# Patient Record
Sex: MALE | Race: WHITE | NOT HISPANIC OR LATINO | URBAN - METROPOLITAN AREA
[De-identification: names, ages, dates, MRNs, and addresses within clinical notes are randomized per-mention and may not be internally consistent; named-entity substitution may affect disease eponyms.]

---

## 2023-02-01 ENCOUNTER — INPATIENT (INPATIENT)
Facility: HOSPITAL | Age: 73
LOS: 0 days | Discharge: HOME | End: 2023-02-02
Attending: HOSPITALIST | Admitting: HOSPITALIST
Payer: MEDICARE

## 2023-02-01 VITALS
TEMPERATURE: 101 F | DIASTOLIC BLOOD PRESSURE: 94 MMHG | SYSTOLIC BLOOD PRESSURE: 153 MMHG | HEART RATE: 121 BPM | RESPIRATION RATE: 18 BRPM

## 2023-02-01 DIAGNOSIS — M19.90 UNSPECIFIED OSTEOARTHRITIS, UNSPECIFIED SITE: ICD-10-CM

## 2023-02-01 DIAGNOSIS — J44.1 CHRONIC OBSTRUCTIVE PULMONARY DISEASE WITH (ACUTE) EXACERBATION: ICD-10-CM

## 2023-02-01 DIAGNOSIS — Z29.9 ENCOUNTER FOR PROPHYLACTIC MEASURES, UNSPECIFIED: ICD-10-CM

## 2023-02-01 DIAGNOSIS — I10 ESSENTIAL (PRIMARY) HYPERTENSION: ICD-10-CM

## 2023-02-01 DIAGNOSIS — A41.9 SEPSIS, UNSPECIFIED ORGANISM: ICD-10-CM

## 2023-02-01 DIAGNOSIS — I48.91 UNSPECIFIED ATRIAL FIBRILLATION: ICD-10-CM

## 2023-02-01 DIAGNOSIS — E78.5 HYPERLIPIDEMIA, UNSPECIFIED: ICD-10-CM

## 2023-02-01 DIAGNOSIS — E11.9 TYPE 2 DIABETES MELLITUS WITHOUT COMPLICATIONS: ICD-10-CM

## 2023-02-01 LAB
ALBUMIN SERPL ELPH-MCNC: 4.4 G/DL — SIGNIFICANT CHANGE UP (ref 3.3–5)
ALP SERPL-CCNC: 61 U/L — SIGNIFICANT CHANGE UP (ref 40–120)
ALT FLD-CCNC: 22 U/L — SIGNIFICANT CHANGE UP (ref 4–41)
ANION GAP SERPL CALC-SCNC: 13 MMOL/L — SIGNIFICANT CHANGE UP (ref 7–14)
APPEARANCE UR: CLEAR — SIGNIFICANT CHANGE UP
APTT BLD: 24.9 SEC — LOW (ref 27–36.3)
AST SERPL-CCNC: 22 U/L — SIGNIFICANT CHANGE UP (ref 4–40)
B PERT DNA SPEC QL NAA+PROBE: SIGNIFICANT CHANGE UP
B PERT+PARAPERT DNA PNL SPEC NAA+PROBE: SIGNIFICANT CHANGE UP
BASE EXCESS BLDV CALC-SCNC: 1 MMOL/L — SIGNIFICANT CHANGE UP (ref -2–3)
BASOPHILS # BLD AUTO: 0.05 K/UL — SIGNIFICANT CHANGE UP (ref 0–0.2)
BASOPHILS NFR BLD AUTO: 0.5 % — SIGNIFICANT CHANGE UP (ref 0–2)
BILIRUB SERPL-MCNC: 0.5 MG/DL — SIGNIFICANT CHANGE UP (ref 0.2–1.2)
BILIRUB UR-MCNC: NEGATIVE — SIGNIFICANT CHANGE UP
BLOOD GAS VENOUS COMPREHENSIVE RESULT: SIGNIFICANT CHANGE UP
BORDETELLA PARAPERTUSSIS (RAPRVP): SIGNIFICANT CHANGE UP
BUN SERPL-MCNC: 16 MG/DL — SIGNIFICANT CHANGE UP (ref 7–23)
C PNEUM DNA SPEC QL NAA+PROBE: SIGNIFICANT CHANGE UP
CALCIUM SERPL-MCNC: 9.1 MG/DL — SIGNIFICANT CHANGE UP (ref 8.4–10.5)
CHLORIDE BLDV-SCNC: 99 MMOL/L — SIGNIFICANT CHANGE UP (ref 96–108)
CHLORIDE SERPL-SCNC: 98 MMOL/L — SIGNIFICANT CHANGE UP (ref 98–107)
CO2 BLDV-SCNC: 29.1 MMOL/L — HIGH (ref 22–26)
CO2 SERPL-SCNC: 25 MMOL/L — SIGNIFICANT CHANGE UP (ref 22–31)
COLOR SPEC: SIGNIFICANT CHANGE UP
CREAT SERPL-MCNC: 1.05 MG/DL — SIGNIFICANT CHANGE UP (ref 0.5–1.3)
DIFF PNL FLD: NEGATIVE — SIGNIFICANT CHANGE UP
EGFR: 74 ML/MIN/1.73M2 — SIGNIFICANT CHANGE UP
EOSINOPHIL # BLD AUTO: 0.01 K/UL — SIGNIFICANT CHANGE UP (ref 0–0.5)
EOSINOPHIL NFR BLD AUTO: 0.1 % — SIGNIFICANT CHANGE UP (ref 0–6)
FLUAV SUBTYP SPEC NAA+PROBE: SIGNIFICANT CHANGE UP
FLUBV RNA SPEC QL NAA+PROBE: SIGNIFICANT CHANGE UP
GAS PNL BLDV: 132 MMOL/L — LOW (ref 136–145)
GLUCOSE BLDV-MCNC: 93 MG/DL — SIGNIFICANT CHANGE UP (ref 70–99)
GLUCOSE SERPL-MCNC: 97 MG/DL — SIGNIFICANT CHANGE UP (ref 70–99)
GLUCOSE UR QL: NEGATIVE — SIGNIFICANT CHANGE UP
HADV DNA SPEC QL NAA+PROBE: SIGNIFICANT CHANGE UP
HCO3 BLDV-SCNC: 28 MMOL/L — SIGNIFICANT CHANGE UP (ref 22–29)
HCOV 229E RNA SPEC QL NAA+PROBE: SIGNIFICANT CHANGE UP
HCOV HKU1 RNA SPEC QL NAA+PROBE: SIGNIFICANT CHANGE UP
HCOV NL63 RNA SPEC QL NAA+PROBE: SIGNIFICANT CHANGE UP
HCOV OC43 RNA SPEC QL NAA+PROBE: SIGNIFICANT CHANGE UP
HCT VFR BLD CALC: 48.9 % — SIGNIFICANT CHANGE UP (ref 39–50)
HCT VFR BLDA CALC: 47 % — SIGNIFICANT CHANGE UP (ref 39–51)
HGB BLD CALC-MCNC: 15.7 G/DL — SIGNIFICANT CHANGE UP (ref 13–17)
HGB BLD-MCNC: 15.3 G/DL — SIGNIFICANT CHANGE UP (ref 13–17)
HMPV RNA SPEC QL NAA+PROBE: DETECTED
HPIV1 RNA SPEC QL NAA+PROBE: SIGNIFICANT CHANGE UP
HPIV2 RNA SPEC QL NAA+PROBE: SIGNIFICANT CHANGE UP
HPIV3 RNA SPEC QL NAA+PROBE: SIGNIFICANT CHANGE UP
HPIV4 RNA SPEC QL NAA+PROBE: SIGNIFICANT CHANGE UP
IANC: 7.58 K/UL — HIGH (ref 1.8–7.4)
IMM GRANULOCYTES NFR BLD AUTO: 0.4 % — SIGNIFICANT CHANGE UP (ref 0–0.9)
INR BLD: 1.08 RATIO — SIGNIFICANT CHANGE UP (ref 0.88–1.16)
KETONES UR-MCNC: NEGATIVE — SIGNIFICANT CHANGE UP
LACTATE BLDV-MCNC: 2.9 MMOL/L — HIGH (ref 0.5–2)
LEUKOCYTE ESTERASE UR-ACNC: NEGATIVE — SIGNIFICANT CHANGE UP
LYMPHOCYTES # BLD AUTO: 0.92 K/UL — LOW (ref 1–3.3)
LYMPHOCYTES # BLD AUTO: 9.9 % — LOW (ref 13–44)
M PNEUMO DNA SPEC QL NAA+PROBE: SIGNIFICANT CHANGE UP
MCHC RBC-ENTMCNC: 27.1 PG — SIGNIFICANT CHANGE UP (ref 27–34)
MCHC RBC-ENTMCNC: 31.3 GM/DL — LOW (ref 32–36)
MCV RBC AUTO: 86.7 FL — SIGNIFICANT CHANGE UP (ref 80–100)
MONOCYTES # BLD AUTO: 0.7 K/UL — SIGNIFICANT CHANGE UP (ref 0–0.9)
MONOCYTES NFR BLD AUTO: 7.5 % — SIGNIFICANT CHANGE UP (ref 2–14)
NEUTROPHILS # BLD AUTO: 7.58 K/UL — HIGH (ref 1.8–7.4)
NEUTROPHILS NFR BLD AUTO: 81.6 % — HIGH (ref 43–77)
NITRITE UR-MCNC: NEGATIVE — SIGNIFICANT CHANGE UP
NRBC # BLD: 0 /100 WBCS — SIGNIFICANT CHANGE UP (ref 0–0)
NRBC # FLD: 0 K/UL — SIGNIFICANT CHANGE UP (ref 0–0)
PCO2 BLDV: 50 MMHG — SIGNIFICANT CHANGE UP (ref 42–55)
PH BLDV: 7.35 — SIGNIFICANT CHANGE UP (ref 7.32–7.43)
PH UR: 6 — SIGNIFICANT CHANGE UP (ref 5–8)
PLATELET # BLD AUTO: 176 K/UL — SIGNIFICANT CHANGE UP (ref 150–400)
PO2 BLDV: 28 MMHG — SIGNIFICANT CHANGE UP
POTASSIUM BLDV-SCNC: 3.9 MMOL/L — SIGNIFICANT CHANGE UP (ref 3.5–5.1)
POTASSIUM SERPL-MCNC: 4.1 MMOL/L — SIGNIFICANT CHANGE UP (ref 3.5–5.3)
POTASSIUM SERPL-SCNC: 4.1 MMOL/L — SIGNIFICANT CHANGE UP (ref 3.5–5.3)
PROT SERPL-MCNC: 7.2 G/DL — SIGNIFICANT CHANGE UP (ref 6–8.3)
PROT UR-MCNC: ABNORMAL
PROTHROM AB SERPL-ACNC: 12.5 SEC — SIGNIFICANT CHANGE UP (ref 10.5–13.4)
RAPID RVP RESULT: DETECTED
RBC # BLD: 5.64 M/UL — SIGNIFICANT CHANGE UP (ref 4.2–5.8)
RBC # FLD: 14.9 % — HIGH (ref 10.3–14.5)
RSV RNA SPEC QL NAA+PROBE: SIGNIFICANT CHANGE UP
RV+EV RNA SPEC QL NAA+PROBE: SIGNIFICANT CHANGE UP
SAO2 % BLDV: 45.6 % — SIGNIFICANT CHANGE UP
SARS-COV-2 RNA SPEC QL NAA+PROBE: SIGNIFICANT CHANGE UP
SODIUM SERPL-SCNC: 136 MMOL/L — SIGNIFICANT CHANGE UP (ref 135–145)
SP GR SPEC: 1.02 — SIGNIFICANT CHANGE UP (ref 1.01–1.05)
TROPONIN T, HIGH SENSITIVITY RESULT: 20 NG/L — SIGNIFICANT CHANGE UP
UROBILINOGEN FLD QL: SIGNIFICANT CHANGE UP
WBC # BLD: 9.3 K/UL — SIGNIFICANT CHANGE UP (ref 3.8–10.5)
WBC # FLD AUTO: 9.3 K/UL — SIGNIFICANT CHANGE UP (ref 3.8–10.5)

## 2023-02-01 PROCEDURE — 99223 1ST HOSP IP/OBS HIGH 75: CPT | Mod: GC

## 2023-02-01 PROCEDURE — 93306 TTE W/DOPPLER COMPLETE: CPT | Mod: 26

## 2023-02-01 PROCEDURE — 71045 X-RAY EXAM CHEST 1 VIEW: CPT | Mod: 26

## 2023-02-01 PROCEDURE — 99232 SBSQ HOSP IP/OBS MODERATE 35: CPT

## 2023-02-01 PROCEDURE — 99285 EMERGENCY DEPT VISIT HI MDM: CPT

## 2023-02-01 RX ORDER — APIXABAN 2.5 MG/1
5 TABLET, FILM COATED ORAL EVERY 12 HOURS
Refills: 0 | Status: DISCONTINUED | OUTPATIENT
Start: 2023-02-01 | End: 2023-02-02

## 2023-02-01 RX ORDER — ACETAMINOPHEN 500 MG
1000 TABLET ORAL ONCE
Refills: 0 | Status: COMPLETED | OUTPATIENT
Start: 2023-02-01 | End: 2023-02-01

## 2023-02-01 RX ORDER — IPRATROPIUM BROMIDE 0.2 MG/ML
500 SOLUTION, NON-ORAL INHALATION ONCE
Refills: 0 | Status: COMPLETED | OUTPATIENT
Start: 2023-02-01 | End: 2023-02-01

## 2023-02-01 RX ORDER — MONTELUKAST 4 MG/1
10 TABLET, CHEWABLE ORAL DAILY
Refills: 0 | Status: DISCONTINUED | OUTPATIENT
Start: 2023-02-01 | End: 2023-02-02

## 2023-02-01 RX ORDER — IPRATROPIUM/ALBUTEROL SULFATE 18-103MCG
3 AEROSOL WITH ADAPTER (GRAM) INHALATION ONCE
Refills: 0 | Status: COMPLETED | OUTPATIENT
Start: 2023-02-01 | End: 2023-02-01

## 2023-02-01 RX ORDER — PANTOPRAZOLE SODIUM 20 MG/1
40 TABLET, DELAYED RELEASE ORAL
Refills: 0 | Status: DISCONTINUED | OUTPATIENT
Start: 2023-02-01 | End: 2023-02-02

## 2023-02-01 RX ORDER — AZITHROMYCIN 500 MG/1
500 TABLET, FILM COATED ORAL EVERY 24 HOURS
Refills: 0 | Status: DISCONTINUED | OUTPATIENT
Start: 2023-02-02 | End: 2023-02-02

## 2023-02-01 RX ORDER — ACETAMINOPHEN 500 MG
650 TABLET ORAL ONCE
Refills: 0 | Status: DISCONTINUED | OUTPATIENT
Start: 2023-02-01 | End: 2023-02-01

## 2023-02-01 RX ORDER — ACETAMINOPHEN 500 MG
650 TABLET ORAL EVERY 6 HOURS
Refills: 0 | Status: DISCONTINUED | OUTPATIENT
Start: 2023-02-01 | End: 2023-02-02

## 2023-02-01 RX ORDER — SODIUM CHLORIDE 9 MG/ML
1000 INJECTION INTRAMUSCULAR; INTRAVENOUS; SUBCUTANEOUS ONCE
Refills: 0 | Status: COMPLETED | OUTPATIENT
Start: 2023-02-01 | End: 2023-02-01

## 2023-02-01 RX ORDER — KETOROLAC TROMETHAMINE 30 MG/ML
30 SYRINGE (ML) INJECTION ONCE
Refills: 0 | Status: DISCONTINUED | OUTPATIENT
Start: 2023-02-01 | End: 2023-02-01

## 2023-02-01 RX ORDER — AZITHROMYCIN 500 MG/1
500 TABLET, FILM COATED ORAL ONCE
Refills: 0 | Status: COMPLETED | OUTPATIENT
Start: 2023-02-01 | End: 2023-02-01

## 2023-02-01 RX ORDER — ONDANSETRON 8 MG/1
4 TABLET, FILM COATED ORAL ONCE
Refills: 0 | Status: COMPLETED | OUTPATIENT
Start: 2023-02-01 | End: 2023-02-01

## 2023-02-01 RX ORDER — METOPROLOL TARTRATE 50 MG
25 TABLET ORAL
Refills: 0 | Status: DISCONTINUED | OUTPATIENT
Start: 2023-02-01 | End: 2023-02-01

## 2023-02-01 RX ORDER — IPRATROPIUM BROMIDE 0.2 MG/ML
500 SOLUTION, NON-ORAL INHALATION EVERY 6 HOURS
Refills: 0 | Status: DISCONTINUED | OUTPATIENT
Start: 2023-02-01 | End: 2023-02-02

## 2023-02-01 RX ORDER — SIMVASTATIN 20 MG/1
20 TABLET, FILM COATED ORAL AT BEDTIME
Refills: 0 | Status: DISCONTINUED | OUTPATIENT
Start: 2023-02-01 | End: 2023-02-02

## 2023-02-01 RX ORDER — CEFTRIAXONE 500 MG/1
1000 INJECTION, POWDER, FOR SOLUTION INTRAMUSCULAR; INTRAVENOUS ONCE
Refills: 0 | Status: COMPLETED | OUTPATIENT
Start: 2023-02-01 | End: 2023-02-01

## 2023-02-01 RX ORDER — FOLIC ACID 0.8 MG
1 TABLET ORAL DAILY
Refills: 0 | Status: DISCONTINUED | OUTPATIENT
Start: 2023-02-01 | End: 2023-02-02

## 2023-02-01 RX ORDER — METOPROLOL TARTRATE 50 MG
25 TABLET ORAL
Refills: 0 | Status: DISCONTINUED | OUTPATIENT
Start: 2023-02-01 | End: 2023-02-02

## 2023-02-01 RX ADMIN — PANTOPRAZOLE SODIUM 40 MILLIGRAM(S): 20 TABLET, DELAYED RELEASE ORAL at 19:12

## 2023-02-01 RX ADMIN — APIXABAN 5 MILLIGRAM(S): 2.5 TABLET, FILM COATED ORAL at 19:11

## 2023-02-01 RX ADMIN — Medication 1 MILLIGRAM(S): at 19:12

## 2023-02-01 RX ADMIN — Medication 3 MILLILITER(S): at 10:23

## 2023-02-01 RX ADMIN — Medication 1000 MILLIGRAM(S): at 10:45

## 2023-02-01 RX ADMIN — Medication 25 MILLIGRAM(S): at 19:12

## 2023-02-01 RX ADMIN — AZITHROMYCIN 255 MILLIGRAM(S): 500 TABLET, FILM COATED ORAL at 10:57

## 2023-02-01 RX ADMIN — Medication 30 MILLIGRAM(S): at 11:53

## 2023-02-01 RX ADMIN — Medication 125 MILLIGRAM(S): at 10:15

## 2023-02-01 RX ADMIN — Medication 100 MILLIGRAM(S): at 19:03

## 2023-02-01 RX ADMIN — Medication 30 MILLIGRAM(S): at 11:23

## 2023-02-01 RX ADMIN — Medication 400 MILLIGRAM(S): at 10:14

## 2023-02-01 RX ADMIN — ONDANSETRON 4 MILLIGRAM(S): 8 TABLET, FILM COATED ORAL at 10:12

## 2023-02-01 RX ADMIN — MONTELUKAST 10 MILLIGRAM(S): 4 TABLET, CHEWABLE ORAL at 19:12

## 2023-02-01 RX ADMIN — SIMVASTATIN 20 MILLIGRAM(S): 20 TABLET, FILM COATED ORAL at 22:39

## 2023-02-01 RX ADMIN — Medication 500 MICROGRAM(S): at 18:33

## 2023-02-01 RX ADMIN — CEFTRIAXONE 100 MILLIGRAM(S): 500 INJECTION, POWDER, FOR SOLUTION INTRAMUSCULAR; INTRAVENOUS at 10:15

## 2023-02-01 RX ADMIN — Medication 100 MILLIGRAM(S): at 22:39

## 2023-02-01 RX ADMIN — SODIUM CHLORIDE 1000 MILLILITER(S): 9 INJECTION INTRAMUSCULAR; INTRAVENOUS; SUBCUTANEOUS at 10:11

## 2023-02-01 RX ADMIN — SODIUM CHLORIDE 1000 MILLILITER(S): 9 INJECTION INTRAMUSCULAR; INTRAVENOUS; SUBCUTANEOUS at 12:05

## 2023-02-01 NOTE — H&P ADULT - PROBLEM SELECTOR PLAN 7
DVT ppx: Eliquis  GI ppx: pantoprazole  Diet: DASH/CC  Code status: DNR/DNI molst in chart  Dispo: pending PT [facility] - c/w simvastatin

## 2023-02-01 NOTE — H&P ADULT - NSHPREVIEWOFSYSTEMS_GEN_ALL_CORE
REVIEW OF SYSTEMS    General: +fever, +fatigue. Denies weight loss, night sweats  Skin: Denies rashes, ulcers  Ophthalmologic: Denies vision changes  ENMT: Denies hearing changes  Respiratory: +dyspnea, +non-productive cough. Denies pleuritic chest pain  Cardiovascular: +orthopnea. Denies palpitations, LE edema, chest pain, syncope	  Gastrointestinal: +nausea/vomiting (briefly, after coming to ED this am). Denies diarrhea, blood in stool, abd pain  Genitourinary: Denies changes in urination  Musculoskeletal: Denies new joint pains  Neurological: Denies headache, weakness, numbness/tingling  Psychiatric: Denies depressed mood

## 2023-02-01 NOTE — H&P ADULT - PROBLEM SELECTOR PLAN 1
Febrile to 102.3, tachycardic, tachypneic on arrival. URI symptoms for several days & recent sick contacts. S/p CTX & azithromycin, 2L NS in ED.  - likely due to human metapneumovirus  - no consolidations on CXR  - f/u BCx  - monitor fever curve  - Tylenol for fever  - Tessalon perles, guaifenesin prn for cough Febrile to 102.3, tachycardic, tachypneic on arrival. URI symptoms for several days & recent sick contacts. S/p CTX & azithromycin, 2L NS in ED.  - likely due to human metapneumovirus  - no consolidations on CXR  Monitor off Abx for now  - f/u BCx  - monitor fever curve  - Tylenol for fever  - Tessalon perles, guaifenesin prn for cough

## 2023-02-01 NOTE — CHART NOTE - NSCHARTNOTEFT_GEN_A_CORE
Search Terms: Nic Borrero, 1950Search Date: 02/01/2023 21:19:08 PM  Searching on behalf of: [id number] - [facility]  The Drug Utilization Report below displays all of the controlled substance prescriptions, if any, that your patient has filled in the last twelve months. The information displayed on this report is compiled from pharmacy submissions to the Department, and accurately reflects the information as submitted by the pharmacies.    This report was requested by: Kaleb Padilla | Reference #: [id number] Search Terms: Nic Borrero, 1950Search Date: 02/01/2023 21:19:08 PM  Searching on behalf of: [id number] - [facility]  The Drug Utilization Report below displays all of the controlled substance prescriptions, if any, that your patient has filled in the last twelve months. The information displayed on this report is compiled from pharmacy submissions to the Department, and accurately reflects the information as submitted by the pharmacies.    This report was requested by: Kaleb Padilla | Reference #: [id number] Search Terms: Nic Borrero, 1950Search Date: 02/01/2023 21:19:08 PM  Searching on behalf of: [id number] - [facility]  The Drug Utilization Report below displays all of the controlled substance prescriptions, if any, that your patient has filled in the last twelve months. The information displayed on this report is compiled from pharmacy submissions to the Department, and accurately reflects the information as submitted by the pharmacies.    This report was requested by: Kaleb Padilla | Reference #: [id number]

## 2023-02-01 NOTE — H&P ADULT - NSHPLABSRESULTS_GEN_ALL_CORE
LABS:                          15.3   9.30  )-----------( 176      ( 2023 09:50 )             48.9         136  |  98  |  16  ----------------------------<  97  4.1   |  25  |  1.05    Ca    9.1      2023 09:50    TPro  7.2  /  Alb  4.4  /  TBili  0.5  /  DBili  x   /  AST  22  /  ALT  22  /  AlkPhos  61  02-    PT/INR - ( 2023 09:50 )   PT: 12.5 sec;   INR: 1.08 ratio      PTT - ( 2023 09:50 )  PTT:24.9 sec    Urinalysis Basic - ( 2023 13:00 )    Color: Light Yellow / Appearance: Clear / S.020 / pH: x  Gluc: x / Ketone: Negative  / Bili: Negative / Urobili: <2 mg/dL   Blood: x / Protein: Trace / Nitrite: Negative   Leuk Esterase: Negative / RBC: x / WBC x   Sq Epi: x / Non Sq Epi: x / Bacteria: x      RADIOLOGY:  Reviewed    < from: Xray Chest 1 View- PORTABLE-Urgent (23 @ 12:27) >    FINDINGS:  No focal opacity. No pleural effusion or pneumothorax.  The heart is normal in size. Cardiac loop recorder in place.  No acute osseous findings.    IMPRESSION:  No focal opacity.    < end of copied text > LABS:                          15.3   9.30  )-----------( 176      ( 2023 09:50 )             48.9         136  |  98  |  16  ----------------------------<  97  4.1   |  25  |  1.05    Ca    9.1      2023 09:50    TPro  7.2  /  Alb  4.4  /  TBili  0.5  /  DBili  x   /  AST  22  /  ALT  22  /  AlkPhos  61      PT/INR - ( 2023 09:50 )   PT: 12.5 sec;   INR: 1.08 ratio      PTT - ( 2023 09:50 )  PTT:24.9 sec    Urinalysis Basic - ( 2023 13:00 )    Color: Light Yellow / Appearance: Clear / S.020 / pH: x  Gluc: x / Ketone: Negative  / Bili: Negative / Urobili: <2 mg/dL   Blood: x / Protein: Trace / Nitrite: Negative   Leuk Esterase: Negative / RBC: x / WBC x   Sq Epi: x / Non Sq Epi: x / Bacteria: x      RADIOLOGY:  personally Reviewed CXR: no consolidations  EKG personally reviewed: Afib with RVR 140s    < from: Xray Chest 1 View- PORTABLE-Urgent (23 @ 12:27) >    FINDINGS:  No focal opacity. No pleural effusion or pneumothorax.  The heart is normal in size. Cardiac loop recorder in place.  No acute osseous findings.    IMPRESSION:  No focal opacity.    < end of copied text >

## 2023-02-01 NOTE — CONSULT NOTE ADULT - ASSESSMENT
72 yr old male, PMH of HTN, HLD, COPD, presents to the Emergency Room with shortness of breath and dry cough. Pt states that he has a loop recorder ([employer]) that was placed over a year ago by his cardiologist in [place] concerned for "low heart rate", but denies any symptoms. Pt was a previous smoker w/ 35 yrs of 6ppd (stopped at 48 yrs old).    In the ED: pt was in afibb w/RVR up to 130's. Upon assessment, pt was rate control between 100-110 HR in Afibb. Pt denied any chest pain, palpitation, fever, headache, N/V, or dizziness. Endorses SOB, but is on room air saturating between 93-96% SpO2.     Plan:     -Currently rate control between 100 - 110 HR in Afibb  -Continue on telemetry monitoring   -Start patient on metoprolol 25mg BID  -Recommend anticoagulation w/ Eliquis 5mg BID  -[id number] score: 2 (age and hypertension)  -Maintain K >4.0 and Mg >2 (replenish as needed)  -Obtain ECHO  -Continue with care with primary team      72 yr old male, PMH of HTN, HLD, COPD, presents to the Emergency Room with shortness of breath and dry cough. Pt states that he has a loop recorder ([employer]) that was placed over a year ago by his cardiologist in [place] concerned for "low heart rate", but denies any symptoms. Pt was a previous smoker w/ 35 yrs of 6ppd (stopped at 48 yrs old).    In the ED: pt was in afibb w/RVR up to 130's. Upon assessment, pt was rate control between 100-110 HR in Afibb. Pt denied any chest pain, palpitation, fever, headache, N/V, or dizziness. Endorses SOB, but is on room air saturating between 93-96% SpO2.     Plan:     -Currently rate control between 100 - 110 HR in Afibb  -Continue on telemetry monitoring   -Start patient on metoprolol 25mg BID  -Recommend anticoagulation w/ Eliquis 5mg BID  -[id number] score: 2 (age and hypertension)  -Maintain K >4.0 and Mg >2 (replenish as needed)  -Obtain ECHO  -Continue with care with primary team      72 yr old male, PMH of HTN, HLD, COPD, presents to the Emergency Room with shortness of breath and dry cough. Pt states that he has a loop recorder ([employer]) that was placed over a year ago by his cardiologist in [place] concerned for "low heart rate", but denies any symptoms. Pt was a previous smoker w/ 35 yrs of 6ppd (stopped at 48 yrs old).    In the ED: pt was in afibb w/RVR up to 130's. Upon assessment, pt was rate control between 100-110 HR in Afibb. Pt denied any chest pain, palpitation, fever, headache, N/V, or dizziness. Endorses SOB, but is on room air saturating between 93-96% SpO2.     Plan:     -Currently rate control between 100 - 110 HR in Afibb  -Continue on telemetry monitoring   -Start patient on metoprolol 25mg BID  -Recommend anticoagulation w/ Eliquis 5mg BID  -[id number] score: 2 (age and hypertension)  -Maintain K >4.0 and Mg >2 (replenish as needed)  -Obtain ECHO  -Continue with care with primary team      72 yr old male, PMH of HTN, HLD, COPD, presents to the Emergency Room with shortness of breath and dry cough. Pt states that he has a loop recorder ([employer]) that was placed over a year ago by his cardiologist in [place] concerned for "low heart rate", but denies any symptoms. Pt was a previous smoker w/ 35 yrs of 6ppd (stopped at 48 yrs old).    In the ED: pt was in afib w/RVR up to 130's. Upon assessment, pt was rate control between 100-110 HR in Afibb. Pt denied any chest pain, palpitation, fever, headache, N/V, or dizziness. Endorses SOB, but is on room air saturating between 93-96% SpO2.     Plan:     -Currently rate control between 100 - 110 HR in Afibb  -Continue on telemetry monitoring   -Start patient on metoprolol 25mg BID  -Recommend anticoagulation w/ Eliquis 5mg BID  -[id number] score: 2 (age and hypertension)  -Maintain K >4.0 and Mg >2 (replenish as needed)  -Obtain ECHO  -Obtain TFTs  -Continue with care with primary team      72 yr old male, PMH of HTN, HLD, COPD, presents to the Emergency Room with shortness of breath and dry cough. Pt states that he has a loop recorder ([employer]) that was placed over a year ago by his cardiologist in [place] concerned for "low heart rate", but denies any symptoms. Pt was a previous smoker w/ 35 yrs of 6ppd (stopped at 48 yrs old).    In the ED: pt was in afib w/RVR up to 130's. Upon assessment, pt was rate control between 100-110 HR in Afibb. Pt denied any chest pain, palpitation, fever, headache, N/V, or dizziness. Endorses SOB, but is on room air saturating between 93-96% SpO2.     Plan:     -Currently rate control between 100 - 110 HR in Afibb  -Continue on telemetry monitoring   -Start patient on metoprolol 25mg BID  -Recommend anticoagulation w/ Eliquis 5mg BID  -[id number] score: 2 (age and hypertension)  -Maintain K >4.0 and Mg >2 (replenish as needed)  -Obtain ECHO  -Obtain TFTs  -Continue with care with primary team      72 yr old male, PMH of HTN, HLD, COPD, presents to the Emergency Room with shortness of breath and dry cough. Pt states that he has a loop recorder ([employer]) that was placed over a year ago by his cardiologist in [place] concerned for "low heart rate", but denies any symptoms. Pt was a previous smoker w/ 35 yrs of 6ppd (stopped at 48 yrs old).    In the ED: pt was in afib w/RVR up to 130's. Upon assessment, pt was rate control between 100-110 HR in Afibb. Pt denied any chest pain, palpitation, fever, headache, N/V, or dizziness. Endorses SOB, but is on room air saturating between 93-96% SpO2.     Plan:     -Currently rate control between 100 - 110 HR in Afibb  -Continue on telemetry monitoring   -Start patient on metoprolol 25mg BID  -Recommend anticoagulation w/ Eliquis 5mg BID  -[id number] score: 2 (age and hypertension)  -Maintain K >4.0 and Mg >2 (replenish as needed)  -Obtain ECHO  -Obtain TFTs  -Continue with care with primary team

## 2023-02-01 NOTE — ED ADULT NURSE NOTE - OBJECTIVE STATEMENT
pt received in rm 22 AAO x 3. pt with hx of copd, DM2. pt c/o shortness of breath since last night and cough. pt denies chest pain, v/d. pt febrile. Respirations 28 at this time. no audible wheezing noted. 20g iv placed to left antecubital. pt placed on cardiac monitor. will continue to monitor.

## 2023-02-01 NOTE — H&P ADULT - NSHPPHYSICALEXAM_GEN_ALL_CORE
T(C): 37 (02-01-23 @ 15:15), Max: 39.1 (02-01-23 @ 10:00)  HR: 106 (02-01-23 @ 15:15) (106 - 149)  BP: 111/68 (02-01-23 @ 15:15) (111/68 - 153/94)  RR: 20 (02-01-23 @ 15:15) (18 - 30)  SpO2: 97% (02-01-23 @ 15:15) (97% - 100%)    ***  CONSTITUTIONAL: Well groomed, no apparent distress  EYES: PERRLA and symmetric, EOMI, No conjunctival or scleral injection, non-icteric  ENMT: Oral mucosa with moist membranes. Normal dentition; no pharyngeal injection or exudates             NECK: Supple, symmetric and without tracheal deviation   RESP: No respiratory distress, no use of accessory muscles; CTA b/l, no WRR  CV: RRR, +S1S2, no MRG; no JVD; no peripheral edema  GI: Soft, NT, ND, no rebound, no guarding; no palpable masses; no hepatosplenomegaly; no hernia palpated  LYMPH: No cervical LAD or tenderness; no axillary LAD or tenderness; no inguinal LAD or tenderness  MSK: Normal gait; No digital clubbing or cyanosis; examination of the (head/neck/spine/ribs/pelvis, RUE, LUE, RLE, LLE) without misalignment,            Normal ROM without pain, no spinal tenderness, normal muscle strength/tone  SKIN: No rashes or ulcers noted; no subcutaneous nodules or induration palpable  NEURO: CN II-XII intact; normal reflexes in upper and lower extremities, sensation intact in upper and lower extremities b/l to light touch   PSYCH: Appropriate insight/judgment; A+O x 3, mood and affect appropriate, recent/remote memory intact T(C): 37 (02-01-23 @ 15:15), Max: 39.1 (02-01-23 @ 10:00)  HR: 106 (02-01-23 @ 15:15) (106 - 149)  BP: 111/68 (02-01-23 @ 15:15) (111/68 - 153/94)  RR: 20 (02-01-23 @ 15:15) (18 - 30)  SpO2: 97% (02-01-23 @ 15:15) (97% - 100%)    CONSTITUTIONAL: No apparent distress  EYES: Sclera clear  ENMT: Oral mucosa with moist membranes  NECK: Supple, symmetric  RESP: Non-labored on NC. Diminished breath sounds in bases, otherwise clear to auscultation, no wheeze or crackles  CV: Irregular rhythm, tachycardic, no murmurs. Trace LE pitting edema  GI: Soft, non-tender, mildly distended, bowel sounds normal  MSK: Normal ROM without pain. No joint effusions noted  SKIN: No rashes or ulcers noted  NEURO: No focal deficits, strength symmetric & intact  PSYCH: A&Ox3 T(C): 37 (02-01-23 @ 15:15), Max: 39.1 (02-01-23 @ 10:00)  HR: 106 (02-01-23 @ 15:15) (106 - 149)  BP: 111/68 (02-01-23 @ 15:15) (111/68 - 153/94)  RR: 20 (02-01-23 @ 15:15) (18 - 30)  SpO2: 97% (02-01-23 @ 15:15) (97% - 100%)    CONSTITUTIONAL: No apparent distress, on NC but turned off  EYES: Sclera clear  ENMT: Oral mucosa with moist membranes  NECK: Supple, symmetric  RESP: Non-labored on NC. Diminished breath sounds in bases, otherwise clear to auscultation, no wheeze or crackles  CV: Irregular rhythm, tachycardic, no murmurs. Trace LE pitting edema  GI: Soft, non-tender, mildly distended, bowel sounds normal  MSK: Normal ROM without pain. No joint effusions noted  SKIN: No rashes or ulcers noted  NEURO: No focal deficits, strength symmetric & intact  PSYCH: A&Ox3

## 2023-02-01 NOTE — H&P ADULT - ASSESSMENT
Mr. Nic Borrero is a 73 yo M with pmh of COPD on daily oral steroids, HTN, HLD, presenting with URI symptoms, SOB x1 day, concern for COPD exacerbation in setting of positive hMPV. Tachycardic on admission & found to have new AFib.    Mr. Nic Borrero is a 71 yo M with pmh of COPD on daily oral steroids, HTN, HLD, presenting with URI symptoms, SOB x1 day, concern for COPD exacerbation in setting of positive hMPV. Tachycardic on admission & found to have new AFib.    Mr. Nic Borrero is a 73 yo M with pmh of COPD on daily oral steroids, HTN, HLD, presenting with URI symptoms, SOB x1 day. Concern for sepsis 2/2 hMPV, tachycardic on admission & found to have new AFib.    Mr. Nic Borrero is a 71 yo M with pmh of COPD on daily oral steroids, HTN, HLD, presenting with URI symptoms, SOB x1 day. Concern for sepsis 2/2 hMPV, tachycardic on admission & found to have new AFib.    Mr. Nic Borrero is a 73 yo M with pmh of COPD on daily oral steroids, HTN, HLD, RA, presenting with URI symptoms, SOB x1 day. Concern for sepsis 2/2 hMPV, tachycardic on admission & found to have new AFib.    Mr. Nic Borrero is a 71 yo M with pmh of COPD on daily oral steroids, HTN, HLD, RA, presenting with URI symptoms, SOB x1 day. Concern for sepsis 2/2 hMPV, tachycardic on admission & found to have new AFib.    MrJosefina Borrero is a 73 yo M visiting from [place], PMH COPD on daily oral steroids, HTN, HLD, RA, presenting with URI symptoms, SOB x1 day. Found to have sepsis 2/2 hMPV c/b new AFib with RVR.   MrJosefina Borrero is a 71 yo M visiting from [place], PMH COPD on daily oral steroids, HTN, HLD, RA, presenting with URI symptoms, SOB x1 day. Found to have sepsis 2/2 hMPV c/b new AFib with RVR.   MrJosefina Borrero is a 73 yo M visiting from [place], PMH COPD on daily oral steroids, HTN, HLD, RA, presenting with URI symptoms, SOB x1 day. Found to have sepsis 2/2 hMPV c/b new AFib with RVR.

## 2023-02-01 NOTE — ED ADULT NURSE NOTE - CHIEF COMPLAINT QUOTE
pATIENT BROUGHT TO er BY ems FOR C/O SHORTNESS OF BREATH FOR THE PAST COUPLE OF DAYS. PT HAS A COUGH. Type 2 DM: FS: 100

## 2023-02-01 NOTE — H&P ADULT - PROBLEM SELECTOR PLAN 2
Longstanding COPD on home prednisone. Former smoker (quit >20 yrs ago). S/p IV Solumedrol 125 in ED  - resume oral prednisone 20 mg   - c/w home montelukast 10 mg  - ipratropium q6h prn - will avoid beta agonists in setting of tachycardia  - c/w azithromycin x3 days for inflammation  - incentive spirometry  - supplemental O2 prn

## 2023-02-01 NOTE — ED PROVIDER NOTE - ATTENDING APP SHARED VISIT CONTRIBUTION OF CARE
Reji Soni DO:  patient seen and evaluated with the PA.  I was present for key portions of the History & Physical, and I agree with the Impression & Plan. 73 yo m pmh htn, hld, asthma, copd, former smoker pw sob.  PW wife bedside, provides collateral.  States that she had a URI 1 week ago, patient then caught it.  Patient has been unwell for the last few days.  Has had malaise, cough, dry, SOB.  Has had mild relief with home medications including inhalers.  Today developed acute worsening shortness of breath prompting evaluation.  Patient also reports F/C.  Also endorses nausea and vomiting.,  Nonbloody nonbilious.  Denies leg swelling and chest pain.  Patient arrives tachycardic, tachypneic, fatigued appearing.  Patient with clear lungs, reduced excursion.  Bilaterally.  No lower extremity swelling.  No history of CHF.  Suspect respiratory infection and albuterol use leading to consequence of A-fib.  We will check blood work, imaging, treat symptoms, reassess.  Will treat for CAP. Reji Soni DO:  patient seen and evaluated with the PA.  I was present for key portions of the History & Physical, and I agree with the Impression & Plan. 71 yo m pmh htn, hld, asthma, copd, former smoker pw sob.  PW wife bedside, provides collateral.  States that she had a URI 1 week ago, patient then caught it.  Patient has been unwell for the last few days.  Has had malaise, cough, dry, SOB.  Has had mild relief with home medications including inhalers.  Today developed acute worsening shortness of breath prompting evaluation.  Patient also reports F/C.  Also endorses nausea and vomiting.,  Nonbloody nonbilious.  Denies leg swelling and chest pain.  Patient arrives tachycardic, tachypneic, fatigued appearing.  Patient with clear lungs, reduced excursion.  Bilaterally.  No lower extremity swelling.  No history of CHF.  Suspect respiratory infection and albuterol use leading to consequence of A-fib.  We will check blood work, imaging, treat symptoms, reassess.  Will treat for CAP. Reji Soni DO:  patient seen and evaluated with the PA.  I was present for key portions of the History & Physical, and I agree with the Impression & Plan. 73 yo m pmh htn, hld, asthma, copd, former smoker pw sob.  PW wife bedside, provides collateral.  States that she had a URI 1 week ago, patient then caught it.  Patient has been unwell for the last few days.  Has had malaise, cough, dry, SOB.  Has had mild relief with home medications including inhalers.  Today developed acute worsening shortness of breath prompting evaluation.  Patient also reports F/C.  Also endorses nausea and vomiting.,  Nonbloody nonbilious.  Denies leg swelling and chest pain.  Patient arrives tachycardic, tachypneic, fatigued appearing.  Patient with clear lungs, reduced excursion.  Bilaterally.  No lower extremity swelling.  No history of CHF.  Suspect respiratory infection and albuterol use leading to consequence of A-fib.  We will check blood work, imaging, treat symptoms, reassess.  Will treat for CAP.. Reji Soni DO:  patient seen and evaluated with the PA.  I was present for key portions of the History & Physical, and I agree with the Impression & Plan. 71 yo m pmh htn, hld, asthma, copd, former smoker pw sob.  PW wife bedside, provides collateral.  States that she had a URI 1 week ago, patient then caught it.  Patient has been unwell for the last few days.  Has had malaise, cough, dry, SOB.  Has had mild relief with home medications including inhalers.  Today developed acute worsening shortness of breath prompting evaluation.  Patient also reports F/C.  Also endorses nausea and vomiting.,  Nonbloody nonbilious.  Denies leg swelling and chest pain.  Patient arrives tachycardic, tachypneic, fatigued appearing.  Patient with clear lungs, reduced excursion.  Bilaterally.  No lower extremity swelling.  No history of CHF.  Suspect respiratory infection and albuterol use leading to consequence of A-fib.  We will check blood work, imaging, treat symptoms, reassess.  Will treat for CAP..

## 2023-02-01 NOTE — H&P ADULT - ATTENDING COMMENTS
72 yr old man visiting from [place] PMH COPD on daily oral steroids, HTN, HLD, RA p/w malaise, worsening cough and SOB x1 day. Found to have sepsis 2/2 hMPV c/b Afib with RVR.     Lungs with decreased BS at bases, no crackles wheezing, normal respiratory effort    Supportive care for hMPV: Start ipratropium Nebs q6, tessalon perles, guaifenesin, incentive spirometry  F/u Bcx, monitor off ABx for now  Afib rate better controlled ranging up to 110s, c/w tele monitoring, start Eliquis 5mg BID for CHADSVASC of 2, metoprolol 25mg BID per EP recs  Check TTE, TSH, FT4  PT eval 72 yr old man visiting from [place] PMH COPD on daily oral steroids, HTN, HLD, RA p/w malaise, worsening cough and SOB x1 day. Found to have sepsis 2/2 hMPV c/b Afib with RVR.     Lungs with decreased BS at bases, no crackles wheezing, normal respiratory effort    Supportive care for hMPV: Start ipratropium Nebs q6, tessalon perles, guaifenesin, incentive spirometry  C/w home prednisone 20mg qd  F/u Bcx, monitor off ABx for now  Afib rate better controlled ranging up to 110s, c/w tele monitoring, start Eliquis 5mg BID for CHADSVASC of 2, metoprolol 25mg BID per EP recs  Check TTE, TSH, FT4  PT eval

## 2023-02-01 NOTE — H&P ADULT - PROBLEM SELECTOR PLAN 4
Pt has hx of rheumatoid arthitis on weekly MTX.   - Tylenol prn for pain  - avoid MTX inpt during acute infection

## 2023-02-01 NOTE — H&P ADULT - PROBLEM SELECTOR PLAN 3
New onset AF RVR on admission, tachy to 140s. Improved rate control following fluid resuscitation.   - pt has implanted loop recorder (~1.5 yrs prior) for "low heart rate," pt believes HR was in 40s-50s. No hx syncope  - admit to tele  - f/u TTE  - f/u TSH/T4  - metoprolol tartrate 25 mg bid  - Eliquis 5 bid  - EP recs appreciated

## 2023-02-01 NOTE — H&P ADULT - PROBLEM SELECTOR PLAN 8
DVT ppx: Eliquis  GI ppx: pantoprazole  Diet: DASH/CC  Code status: DNR/DNI molst in chart  Dispo: pending PT [place]

## 2023-02-01 NOTE — ED ADULT NURSE NOTE - INTERVENTIONS DEFINITIONS
[place] to call system/Call bell, personal items and telephone within reach/Instruct patient to call for assistance/Non-slip footwear when patient is off stretcher/Physically safe environment: no spills, clutter or unnecessary equipment/Stretcher in lowest position, wheels locked, appropriate side rails in place/Monitor gait and stability/Reinforce activity limits and safety measures with patient and family [place] to call system/Call bell, personal items and telephone within reach/Instruct patient to call for assistance/Non-slip footwear when patient is off stretcher/Physically safe environment: no spills, clutter or unnecessary equipment/Stretcher in lowest position, wheels locked, appropriate side rails in place/Monitor gait and stability/Reinforce activity limits and safety measures with patient and family [place] to call system/Call bell, personal items and telephone within reach/Instruct patient to call for assistance/Non-slip footwear when patient is off stretcher/Physically safe environment: no spills, clutter or unnecessary equipment/Stretcher in lowest position, wheels locked, appropriate side rails in place/Monitor gait and stability/Reinforce activity limits and safety measures with patient and family

## 2023-02-01 NOTE — ED PROVIDER NOTE - CARE PLAN
1 Principal Discharge DX:	COPD exacerbation  Secondary Diagnosis:	Respiratory distress, acute  Secondary Diagnosis:	Bronchitis due to human metapneumovirus (hMPV)

## 2023-02-01 NOTE — ED PROVIDER NOTE - PROGRESS NOTE DETAILS
Reji Soni, DO: pt reassessed. being resuscitated. hr 120-130s. suspect secondary to sepsis/resp infection. will monitor and provide sx control prior to rate control. JE Jerez: Pt reassessed, tachypnea and retractions have resolved, HR now in the 110s from 140s w/ tylenol/toradol and fluids. +Human meta-pneumovirus. Spoke w/ hospitalist regarding afib and anticoagulation use, rec we call EP for eval. SPoke w/ EP, will come to see pt.

## 2023-02-01 NOTE — H&P ADULT - HISTORY OF PRESENT ILLNESS
Mr. Nic Borrero is a 73 yo M with pmh of COPD on daily oral steroids, HTN, HLD, presenting with worsening malaise, cough, decreased appetite for a few days, and worsening shortness of breath x1 day.     Mr. Nic Borrero is a 71 yo M with pmh of COPD on daily oral steroids, HTN, HLD, presenting with worsening malaise, cough, decreased appetite for a few days, and worsening shortness of breath x1 day.    Pt reports recent URI symptoms for the past few days. His wife & grandson were sick with similar symptoms. Endorses non-productive cough, fatigue, decreased PO intake. Last night he became increasingly dyspneic at rest. Denies chest pain, palpitations, dizziness, abd pain, blood in stool/urine, or pain/swelling in LEs. Briefly unsteady on his feet this morning when he stood up, but did not fall. Of note, pt had loop recorder implanted by his cardiologist ~1.5 yrs ago for low HR, pt says his HR was 40s-50s. No hx syncope.    Pt lives in [place] and came to [place] on 1/21 to visit family. He has longstanding COPD on prednisone 20 mg daily, not on home O2, and is a former smoker who quit >20 years ago.    On arrival to ED, pt febrile to 102.3, tachycardic in Afib to 140s, tachypneic to RR 30. Non-hypoxic on RA. S/p azithromycin & CTX, Solumedrol. S/p 2L NS. Pt's HR did improve to the 100s-110s following fluid resuscitation.     Mr. Nic Borrero is a 73 yo M with pmh of COPD on daily oral steroids, HTN, HLD, presenting with worsening malaise, cough, decreased appetite for a few days, and worsening shortness of breath x1 day.    Pt reports recent URI symptoms for the past few days. His wife & grandson were sick with similar symptoms. Endorses non-productive cough, fatigue, decreased PO intake. Last night he became increasingly dyspneic at rest. Denies chest pain, palpitations, dizziness, abd pain, blood in stool/urine, or pain/swelling in LEs. Briefly unsteady on his feet this morning when he stood up, but did not fall. Of note, pt had loop recorder implanted by his cardiologist ~1.5 yrs ago for low HR, pt says his HR was 40s-50s. No hx syncope.    Pt lives in [place] and came to [place] on 1/21 to visit family. He has longstanding COPD on prednisone 20 mg daily, not on home O2, and is a former smoker who quit >20 years ago.    On arrival to ED, pt febrile to 102.3, tachycardic in Afib to 140s, tachypneic to RR 30. Non-hypoxic on RA. S/p azithromycin & CTX, Solumedrol. S/p 2L NS. Pt's HR did improve to the 100s-110s following fluid resuscitation.     Mr. Nic Borrero is a 71 yo M with pmh of COPD on daily oral steroids, HTN, HLD, presenting with worsening malaise, cough, decreased appetite for a few days, and worsening shortness of breath x1 day.    Pt reports recent URI symptoms for the past few days. His wife & grandson were sick with similar symptoms. Endorses non-productive cough, fatigue, decreased PO intake. Last night he became increasingly dyspneic at rest. Denies chest pain, palpitations, dizziness, abd pain, blood in stool/urine, or pain/swelling in LEs. Briefly unsteady on his feet this morning when he stood up, but did not fall. Of note, pt had loop recorder implanted by his cardiologist ~1.5 yrs ago for low HR, pt says his HR was 40s-50s. No hx syncope.    Pt lives in [place] and came to [place] on 1/21 to visit family. He has longstanding COPD on prednisone 20 mg daily, not on home O2, and is a former smoker who quit >20 years ago.    On arrival to ED, pt febrile to 102.3, tachycardic in Afib to 140s, tachypneic to RR 30. Non-hypoxic on RA. S/p azithromycin & CTX, Solumedrol. S/p 2L NS. Pt's HR did improve to the 100s-110s following fluid resuscitation. Lactate 2.9 on admission downtrended to 1.3, pt non-acidotic on VBG.     Mr. Nic Borrero is a 73 yo M with pmh of COPD on daily oral steroids, HTN, HLD, RA, presenting with worsening malaise, cough, decreased appetite for a few days, and worsening shortness of breath x1 day.    Pt reports recent URI symptoms for the past few days. His wife & grandson were sick with similar symptoms. Endorses non-productive cough, fatigue, decreased PO intake. Last night he became increasingly dyspneic at rest. Denies chest pain, palpitations, dizziness, abd pain, blood in stool/urine, or pain/swelling in LEs. Briefly unsteady on his feet this morning when he stood up, but did not fall. Of note, pt had loop recorder implanted by his cardiologist ~1.5 yrs ago for low HR, pt says his HR was 40s-50s. No hx syncope.    Pt lives in [place] and came to [place] on 1/21 to visit family. He has longstanding COPD on prednisone 20 mg daily, not on home O2, and is a former smoker who quit >20 years ago.    On arrival to ED, pt febrile to 102.3, tachycardic in Afib to 140s, tachypneic to RR 30. Non-hypoxic on RA. S/p azithromycin & CTX, Solumedrol. S/p 2L NS. Pt's HR did improve to the 100s-110s following fluid resuscitation. Lactate 2.9 on admission downtrended to 1.3, pt non-acidotic on VBG.     Mr. Nic Borrero is a 71 yo M with pmh of COPD on daily oral steroids, HTN, HLD, RA, presenting with worsening malaise, cough, decreased appetite for a few days, and worsening shortness of breath x1 day.    Pt reports recent URI symptoms for the past few days. His wife & grandson were sick with similar symptoms. Endorses non-productive cough, fatigue, decreased PO intake. Last night he became increasingly dyspneic at rest. Denies chest pain, palpitations, dizziness, abd pain, blood in stool/urine, or pain/swelling in LEs. Briefly unsteady on his feet this morning when he stood up, but did not fall. Of note, pt had loop recorder implanted by his cardiologist ~1.5 yrs ago for low HR, pt says his HR was 40s-50s. No hx syncope.    Pt lives in [place] and came to [place] on 1/21 to visit family. He has longstanding COPD on prednisone 20 mg daily, not on home O2, and is a former smoker who quit >20 years ago.    On arrival to ED, pt febrile to 102.3, tachycardic in Afib to 140s, tachypneic to RR 30. Non-hypoxic on RA. S/p azithromycin & CTX, Solumedrol. S/p 2L NS. Pt's HR did improve to the 100s-110s following fluid resuscitation. Lactate 2.9 on admission downtrended to 1.3, pt non-acidotic on VBG.

## 2023-02-01 NOTE — ED ADULT NURSE REASSESSMENT NOTE - NS ED NURSE REASSESS COMMENT FT1
Break RN: Report received from HOUSTON Orellana. Respirations even and unlabored, no signs/symptoms of acute distress. Patient denies pain and offers no complaints at this time. Safety measures in place and call bell within reach.

## 2023-02-01 NOTE — CONSULT NOTE ADULT - SUBJECTIVE AND OBJECTIVE BOX
CHIEF COMPLAINT: shortness of breath    HISTORY OF PRESENT ILLNESS: 72 yr old male, PMH of HTN, CHLD, COPD, presents to the Emergency Room with shortness of breath and dry cough. Pt states he been having it since last night and it worsen overnight with no improvement after using home inhaler. Pt states that this morning, the SOB made it difficult for him to ambulate and perform his ADL's (otherwise he is independent w/ no assistance). Pt denies any chest pain, palpitations, any previous arrhythmias, fever dizziness, headache, blurry vision, swelling of extremities or recent falls. Pt states that he has a loop recorder ([employer]) that was placed over a year ago by his cardiologist in [place] concerned for "low heart rate", but denies any symptoms. Pt was a previous smoker w/ 35 yrs of 6ppd (stopped at 48 yrs old) and denies any ETOH or illicit drug use. Pt recently came back from [place] 1/21/2023.    In the ED: pt was in afibb w/RVR up to 130's. Upon assessment, pt was rate control between 100-110 HR in Afibb. Pt denied any chest pain, palpitation, headache, N/V, or dizziness. Endorses SOB, but is on room air saturating between 93-96% SpO2.     PAST MEDICAL & SURGICAL HISTORY:  HTN (hypertension)    HLD (hyperlipidemia)    COPD exacerbation        PREVIOUS DIAGNOSTIC TESTING:    Echocardiogram:   Catheterization:  Stress Test:  	    MEDICATIONS:                  FAMILY HISTORY:      SOCIAL HISTORY:      Smoker: previous smoker w/ 35 yrs of 6ppd (stopped at 48 yrs old)  [X ] Alcohol  [X] Drugs    Allergies    No Known Allergies    Intolerances    	    REVIEW OF SYSTEMS:  CONSTITUTIONAL: No fever, weight loss, or fatigue  EYES: No eye pain, visual disturbances, or discharge  ENMT:  No difficulty hearing, tinnitus, vertigo; No sinus or throat pain  NECK: No pain or stiffness  RESPIRATORY: (+) for shortness of breath, dry cough, and mild wheezing  CARDIOVASCULAR: No chest pain, palpitations, passing out, dizziness, or leg swelling  GASTROINTESTINAL: No abdominal or epigastric pain. No nausea, vomiting, or hematemesis; No diarrhea or constipation. No melena or hematochezia.  GENITOURINARY: No dysuria, frequency, hematuria, or incontinence  NEUROLOGICAL: No headaches, memory loss, loss of strength, numbness, or tremors  SKIN: No itching, burning, rashes, or lesions   LYMPH Nodes: No enlarged glands  ENDOCRINE: No heat or cold intolerance; No hair loss  MUSCULOSKELETAL: No joint pain or swelling; No muscle, back, or extremity pain  PSYCHIATRIC: No depression, anxiety, mood swings, or difficulty sleeping  HEME/LYMPH: No easy bruising, or bleeding gums  ALLERY AND IMMUNOLOGIC: No hives or eczema	    [ ] All others negative	  [ ] Unable to obtain    PHYSICAL EXAM:  T(C): 39.1 (02-01-23 @ 11:53), Max: 39.1 (02-01-23 @ 10:00)  HR: 116 (02-01-23 @ 11:53) (116 - 149)  BP: 145/89 (02-01-23 @ 10:00) (145/89 - 153/94)  RR: 30 (02-01-23 @ 10:00) (18 - 30)  SpO2: 100% (02-01-23 @ 10:00) (100% - 100%)  Wt(kg): --  I&O's Summary      Appearance: Normal	  HEENT:   Normal oral mucosa, PERRL, EOMI	  Lymphatic: No lymphadenopathy  Cardiovascular: Normal S1 S2, No JVD, No murmurs, No edema  Respiratory: Lungs clear to auscultation	  Psychiatry: A & O x 3, Mood & affect appropriate  Gastrointestinal:  Soft, Non-tender, + BS	  Skin: No rashes, No ecchymoses, No cyanosis	  Neurologic: Non-focal  Extremities: Normal range of motion, No clubbing, cyanosis or edema  Vascular: Peripheral pulses palpable 2+ bilaterally    TELEMETRY: 	    ECG:  	  RADIOLOGY:  OTHER: 	  	  LABS:	 	    CARDIAC MARKERS:                                  15.3   9.30  )-----------( 176      ( 01 Feb 2023 09:50 )             48.9     02-01    136  |  98  |  16  ----------------------------<  97  4.1   |  25  |  1.05    Ca    9.1      01 Feb 2023 09:50    TPro  7.2  /  Alb  4.4  /  TBili  0.5  /  DBili  x   /  AST  22  /  ALT  22  /  AlkPhos  61  02-01    proBNP: Serum Pro-Brain Natriuretic Peptide: 91 pg/mL (02-01 @ 09:50)    Lipid Profile:   HgA1c:   TSH:          CHIEF COMPLAINT: shortness of breath    HISTORY OF PRESENT ILLNESS: 72 yr old male, PMH of HTN, CHLD, COPD, presents to the Emergency Room with shortness of breath and dry cough. Pt states he been having it since last night and it worsen overnight with no improvement after using home inhaler. Pt states that this morning, the SOB made it difficult for him to ambulate and perform his ADL's (otherwise he is independent w/ no assistance). Pt denies any chest pain, palpitations, any previous arrhythmias, fever dizziness, headache, blurry vision, swelling of extremities or recent falls. Pt states that he has a loop recorder ([employer]) that was placed over a year ago by his cardiologist in [place] concerned for "low heart rate", but denies any symptoms. Pt was a previous smoker w/ 35 yrs of 6ppd (stopped at 48 yrs old) and denies any ETOH or illicit drug use. Pt recently came back from [place] 1/21/2023.    In the ED: pt was in afibb w/RVR up to 130's. Upon assessment, pt was rate control between 100-110 HR in Afibb. Pt denied any chest pain, palpitation, headache, N/V, or dizziness. Endorses SOB, but is on room air saturating between 93-96% SpO2.     PAST MEDICAL & SURGICAL HISTORY:  HTN (hypertension)    HLD (hyperlipidemia)    COPD exacerbation        PREVIOUS DIAGNOSTIC TESTING:    Echocardiogram:   Catheterization:  Stress Test:  	    MEDICATIONS:                  FAMILY HISTORY:      SOCIAL HISTORY:      Smoker: previous smoker w/ 35 yrs of 6ppd (stopped at 48 yrs old)  [X ] Alcohol  [X] Drugs    Allergies    No Known Allergies    Intolerances    	    REVIEW OF SYSTEMS:  CONSTITUTIONAL: No fever, weight loss, or fatigue  EYES: No eye pain, visual disturbances, or discharge  ENMT:  No difficulty hearing, tinnitus, vertigo; No sinus or throat pain  NECK: No pain or stiffness  RESPIRATORY: (+) for shortness of breath, dry cough, and mild wheezing  CARDIOVASCULAR: No chest pain, palpitations, passing out, dizziness, or leg swelling  GASTROINTESTINAL: No abdominal or epigastric pain. No nausea, vomiting, or hematemesis; No diarrhea or constipation. No melena or hematochezia.  GENITOURINARY: No dysuria, frequency, hematuria, or incontinence  NEUROLOGICAL: No headaches, memory loss, loss of strength, numbness, or tremors  SKIN: No itching, burning, rashes, or lesions   LYMPH Nodes: No enlarged glands  ENDOCRINE: No heat or cold intolerance; No hair loss  MUSCULOSKELETAL: No joint pain or swelling; No muscle, back, or extremity pain  PSYCHIATRIC: No depression, anxiety, mood swings, or difficulty sleeping  HEME/LYMPH: No easy bruising, or bleeding gums  ALLERY AND IMMUNOLOGIC: No hives or eczema	    [ ] All others negative	  [ ] Unable to obtain    PHYSICAL EXAM:  T(C): 39.1 (02-01-23 @ 11:53), Max: 39.1 (02-01-23 @ 10:00)  HR: 116 (02-01-23 @ 11:53) (116 - 149)  BP: 145/89 (02-01-23 @ 10:00) (145/89 - 153/94)  RR: 30 (02-01-23 @ 10:00) (18 - 30)  SpO2: 100% (02-01-23 @ 10:00) (100% - 100%)  Wt(kg): --  I&O's Summary      Appearance: Normal	  HEENT:   Normal oral mucosa, PERRL, EOMI	  Lymphatic: No lymphadenopathy  Cardiovascular: Normal S1 S2, No JVD, No murmurs, No edema  Respiratory: Lungs clear to auscultation	  Psychiatry: A & O x 3, Mood & affect appropriate  Gastrointestinal:  Soft, Non-tender, + BS	  Skin: No rashes, No ecchymoses, No cyanosis	  Neurologic: Non-focal  Extremities: Normal range of motion, No clubbing, cyanosis or edema  Vascular: Peripheral pulses palpable 2+ bilaterally    TELEMETRY: 	    ECG:  	  RADIOLOGY:  OTHER: 	  	  LABS:	 	    CARDIAC MARKERS:                                  15.3   9.30  )-----------( 176      ( 01 Feb 2023 09:50 )             48.9     02-01    136  |  98  |  16  ----------------------------<  97  4.1   |  25  |  1.05    Ca    9.1      01 Feb 2023 09:50    TPro  7.2  /  Alb  4.4  /  TBili  0.5  /  DBili  x   /  AST  22  /  ALT  22  /  AlkPhos  61  02-01    proBNP: Serum Pro-Brain Natriuretic Peptide: 91 pg/mL (02-01 @ 09:50)    Lipid Profile:   HgA1c:   TSH:          CHIEF COMPLAINT: shortness of breath    HISTORY OF PRESENT ILLNESS: 72 yr old male, PMH of HTN, CHLD, COPD, presents to the Emergency Room with shortness of breath and dry cough. Pt states he been having it since last night and it worsen overnight with no improvement after using home inhaler. Pt states that this morning, the SOB made it difficult for him to ambulate and perform his ADL's (otherwise he is independent w/ no assistance). Pt denies any chest pain, palpitations, any previous arrhythmias, fever dizziness, headache, blurry vision, swelling of extremities or recent falls. Pt states that he has a loop recorder ([employer]) that was placed over a year ago by his cardiologist in [place] concerned for "low heart rate", but denies any symptoms. Pt was a previous smoker w/ 35 yrs of 6ppd (stopped at 48 yrs old) and denies any ETOH or illicit drug use. Pt recently came back from [place] 1/21/2023.    In the ED: pt was in afibb w/RVR up to 130's. Upon assessment, pt was rate control between 100-110 HR in Afibb. Pt denied any chest pain, palpitation, headache, N/V, or dizziness. Endorses SOB, but is on room air saturating between 93-96% SpO2.     PAST MEDICAL & SURGICAL HISTORY:  HTN (hypertension)    HLD (hyperlipidemia)    COPD exacerbation        PREVIOUS DIAGNOSTIC TESTING:    Echocardiogram:   Catheterization:  Stress Test:  	    MEDICATIONS:                  FAMILY HISTORY:      SOCIAL HISTORY:      Smoker: previous smoker w/ 35 yrs of 6ppd (stopped at 48 yrs old)  [X ] Alcohol  [X] Drugs    Allergies    No Known Allergies    Intolerances    	    REVIEW OF SYSTEMS:  CONSTITUTIONAL: No fever, weight loss, or fatigue  EYES: No eye pain, visual disturbances, or discharge  ENMT:  No difficulty hearing, tinnitus, vertigo; No sinus or throat pain  NECK: No pain or stiffness  RESPIRATORY: (+) for shortness of breath, dry cough, and mild wheezing  CARDIOVASCULAR: No chest pain, palpitations, passing out, dizziness, or leg swelling  GASTROINTESTINAL: No abdominal or epigastric pain. No nausea, vomiting, or hematemesis; No diarrhea or constipation. No melena or hematochezia.  GENITOURINARY: No dysuria, frequency, hematuria, or incontinence  NEUROLOGICAL: No headaches, memory loss, loss of strength, numbness, or tremors  SKIN: No itching, burning, rashes, or lesions   LYMPH Nodes: No enlarged glands  ENDOCRINE: No heat or cold intolerance; No hair loss  MUSCULOSKELETAL: No joint pain or swelling; No muscle, back, or extremity pain  PSYCHIATRIC: No depression, anxiety, mood swings, or difficulty sleeping  HEME/LYMPH: No easy bruising, or bleeding gums  ALLERY AND IMMUNOLOGIC: No hives or eczema	    [ ] All others negative	  [ ] Unable to obtain    PHYSICAL EXAM:  T(C): 39.1 (02-01-23 @ 11:53), Max: 39.1 (02-01-23 @ 10:00)  HR: 116 (02-01-23 @ 11:53) (116 - 149)  BP: 145/89 (02-01-23 @ 10:00) (145/89 - 153/94)  RR: 30 (02-01-23 @ 10:00) (18 - 30)  SpO2: 100% (02-01-23 @ 10:00) (100% - 100%)  Wt(kg): --  I&O's Summary      Appearance: Normal	  HEENT:   Normal oral mucosa, PERRL, EOMI	  Lymphatic: No lymphadenopathy  Cardiovascular: Normal S1 S2, No JVD, No murmurs, No edema  Respiratory: Lungs clear to auscultation	  Psychiatry: A & O x 3, Mood & affect appropriate  Gastrointestinal:  Soft, Non-tender, + BS	  Skin: No rashes, No ecchymoses, No cyanosis	  Neurologic: Non-focal  Extremities: Normal range of motion, No clubbing, cyanosis or edema  Vascular: Peripheral pulses palpable 2+ bilaterally    TELEMETRY: 	    ECG:  	  RADIOLOGY:  OTHER: 	  	  LABS:	 	    CARDIAC MARKERS:                                  15.3   9.30  )-----------( 176      ( 01 Feb 2023 09:50 )             48.9     02-01    136  |  98  |  16  ----------------------------<  97  4.1   |  25  |  1.05    Ca    9.1      01 Feb 2023 09:50    TPro  7.2  /  Alb  4.4  /  TBili  0.5  /  DBili  x   /  AST  22  /  ALT  22  /  AlkPhos  61  02-01    proBNP: Serum Pro-Brain Natriuretic Peptide: 91 pg/mL (02-01 @ 09:50)    Lipid Profile:   HgA1c:   TSH:          CHIEF COMPLAINT: Shortness of breath and atrial fibrillation with RVR    HISTORY OF PRESENT ILLNESS: 72 yr old male, PMH of HTN, CHLD, COPD, presents to the Emergency Room with shortness of breath and dry cough. Patient reports having mild SOB for few days which worsened overnight with no improvement after using home inhaler. Pt states that this morning, the SOB made it difficult for him to ambulate and perform his ADL's (otherwise he is independent w/ no assistance). Pt denies any chest pain, palpitations, any previous arrhythmias, fever dizziness, headache, blurry vision, swelling of extremities or recent falls. Pt states that he has a loop recorder ([employer]) that was placed over a year ago by his cardiologist in [place] concerned for "low heart rate", but denies any symptoms. Pt was a previous smoker w/ 35 yrs of 6ppd (stopped at 48 yrs old) and denies any ETOH or illicit drug use. Pt recently came back from [place] 1/21/2023.    In the ED: pt was in afib w/RVR up to 130's. Upon assessment, pt was rate controlled between 100-110 HR in Afib. Pt denied any chest pain, palpitation, headache, N/V, or dizziness. Endorses SOB, but is on room air saturating between 93-96% SpO2.     PAST MEDICAL & SURGICAL HISTORY:  HTN (hypertension)    HLD (hyperlipidemia)    COPD exacerbation        PREVIOUS DIAGNOSTIC TESTING:    Echocardiogram:  Pending      MEDICATIONS:                  FAMILY HISTORY:  None significant      SOCIAL HISTORY:      Smoker: previous smoker w/ 35 yrs of 6ppd (stopped at 48 yrs old)  [-] Alcohol  [-] Drugs    Allergies    No Known Allergies    Intolerances    	    REVIEW OF SYSTEMS:  CONSTITUTIONAL: No fever, weight loss, or fatigue  EYES: No eye pain, visual disturbances, or discharge  ENMT:  No difficulty hearing, tinnitus, vertigo; No sinus or throat pain  NECK: No pain or stiffness  RESPIRATORY: (+) for shortness of breath, dry cough, and mild wheezing  CARDIOVASCULAR: No chest pain, palpitations, passing out, dizziness, or leg swelling  GASTROINTESTINAL: No abdominal or epigastric pain. No nausea, vomiting, or hematemesis; No diarrhea or constipation. No melena or hematochezia.  GENITOURINARY: No dysuria, frequency, hematuria, or incontinence  NEUROLOGICAL: No headaches, memory loss, loss of strength, numbness, or tremors  SKIN: No itching, burning, rashes, or lesions   LYMPH Nodes: No enlarged glands  ENDOCRINE: No heat or cold intolerance; No hair loss  MUSCULOSKELETAL: No joint pain or swelling; No muscle, back, or extremity pain  PSYCHIATRIC: No depression, anxiety, mood swings, or difficulty sleeping  HEME/LYMPH: No easy bruising, or bleeding gums  ALLERY AND IMMUNOLOGIC: No hives or eczema	    [x] All others negative	  [ ] Unable to obtain    PHYSICAL EXAM:  T(C): 39.1 (02-01-23 @ 11:53), Max: 39.1 (02-01-23 @ 10:00)  HR: 116 (02-01-23 @ 11:53) (116 - 149)  BP: 145/89 (02-01-23 @ 10:00) (145/89 - 153/94)  RR: 30 (02-01-23 @ 10:00) (18 - 30)  SpO2: 100% (02-01-23 @ 10:00) (100% - 100%)  Wt(kg): --  I&O's Summary      Appearance: Normal	  HEENT:   Normal oral mucosa, PERRL, EOMI	  Lymphatic: No lymphadenopathy  Cardiovascular: Normal S1 S2, No JVD, No murmurs, No edema  Respiratory: Lungs clear to auscultation	  Psychiatry: A & O x 3, Mood & affect appropriate  Gastrointestinal:  Soft, Non-tender, + BS	  Skin: No rashes, No ecchymoses, No cyanosis	  Neurologic: Non-focal  Extremities: Normal range of motion, No clubbing, cyanosis or edema  Vascular: Peripheral pulses palpable 2+ bilaterally    TELEMETRY: Atrial fibrillation with VR 110s-130s 	    ECG:  	Atrial fibrillation with  bpm;   RADIOLOGY:  OTHER: 	  	  LABS:	 	    CARDIAC MARKERS:                        15.3   9.30  )-----------( 176      ( 01 Feb 2023 09:50 )             48.9     02-01    136  |  98  |  16  ----------------------------<  97  4.1   |  25  |  1.05    Ca    9.1      01 Feb 2023 09:50    TPro  7.2  /  Alb  4.4  /  TBili  0.5  /  DBili  x   /  AST  22  /  ALT  22  /  AlkPhos  61  02-01    proBNP: Serum Pro-Brain Natriuretic Peptide: 91 pg/mL (02-01 @ 09:50)    TSH: Pending         CHIEF COMPLAINT: Shortness of breath and atrial fibrillation with RVR    HISTORY OF PRESENT ILLNESS: 72 yr old male, PMH of HTN, CHLD, COPD, presents to the Emergency Room with shortness of breath and dry cough. Patient reports having mild SOB for few days which worsened overnight with no improvement after using home inhaler. Pt states that this morning, the SOB made it difficult for him to ambulate and perform his ADL's (otherwise he is independent w/ no assistance). Pt denies any chest pain, palpitations, any previous arrhythmias, fever dizziness, headache, blurry vision, swelling of extremities or recent falls. Pt states that he has a loop recorder ([employer]) that was placed over a year ago by his cardiologist in [place] concerned for "low heart rate", but denies any symptoms. Pt was a previous smoker w/ 35 yrs of 6ppd (stopped at 48 yrs old) and denies any ETOH or illicit drug use. Pt recently came back from [place] 1/21/2023.    In the ED: pt was in afib w/RVR up to 130's. Upon assessment, pt was rate controlled between 100-110 HR in Afib. Pt denied any chest pain, palpitation, headache, N/V, or dizziness. Endorses SOB, but is on room air saturating between 93-96% SpO2.     PAST MEDICAL & SURGICAL HISTORY:  HTN (hypertension)    HLD (hyperlipidemia)    COPD exacerbation        PREVIOUS DIAGNOSTIC TESTING:    Echocardiogram:  Pending      MEDICATIONS:                  FAMILY HISTORY:  None significant      SOCIAL HISTORY:      Smoker: previous smoker w/ 35 yrs of 6ppd (stopped at 48 yrs old)  [-] Alcohol  [-] Drugs    Allergies    No Known Allergies    Intolerances    	    REVIEW OF SYSTEMS:  CONSTITUTIONAL: No fever, weight loss, or fatigue  EYES: No eye pain, visual disturbances, or discharge  ENMT:  No difficulty hearing, tinnitus, vertigo; No sinus or throat pain  NECK: No pain or stiffness  RESPIRATORY: (+) for shortness of breath, dry cough, and mild wheezing  CARDIOVASCULAR: No chest pain, palpitations, passing out, dizziness, or leg swelling  GASTROINTESTINAL: No abdominal or epigastric pain. No nausea, vomiting, or hematemesis; No diarrhea or constipation. No melena or hematochezia.  GENITOURINARY: No dysuria, frequency, hematuria, or incontinence  NEUROLOGICAL: No headaches, memory loss, loss of strength, numbness, or tremors  SKIN: No itching, burning, rashes, or lesions   LYMPH Nodes: No enlarged glands  ENDOCRINE: No heat or cold intolerance; No hair loss  MUSCULOSKELETAL: No joint pain or swelling; No muscle, back, or extremity pain  PSYCHIATRIC: No depression, anxiety, mood swings, or difficulty sleeping  HEME/LYMPH: No easy bruising, or bleeding gums  ALLERY AND IMMUNOLOGIC: No hives or eczema	    [x] All others negative	  [ ] Unable to obtain    PHYSICAL EXAM:  T(C): 39.1 (02-01-23 @ 11:53), Max: 39.1 (02-01-23 @ 10:00)  HR: 116 (02-01-23 @ 11:53) (116 - 149)  BP: 145/89 (02-01-23 @ 10:00) (145/89 - 153/94)  RR: 30 (02-01-23 @ 10:00) (18 - 30)  SpO2: 100% (02-01-23 @ 10:00) (100% - 100%)  Wt(kg): --  I&O's Summary      Appearance: Normal	  HEENT:   Normal oral mucosa, PERRL, EOMI	  Lymphatic: No lymphadenopathy  Cardiovascular: Normal S1 S2, No JVD, No murmurs, No edema  Respiratory: Lungs clear to auscultation	  Psychiatry: A & O x 3, Mood & affect appropriate  Gastrointestinal:  Soft, Non-tender, + BS	  Skin: No rashes, No ecchymoses, No cyanosis	  Neurologic: Non-focal  Extremities: Normal range of motion, No clubbing, cyanosis or edema  Vascular: Peripheral pulses palpable 2+ bilaterally    TELEMETRY: Atrial fibrillation with VR 110s-130s 	    ECG:  	Atrial fibrillation with  bpm;   RADIOLOGY:  OTHER: 	  	  LABS:	 	    CARDIAC MARKERS:                        15.3   9.30  )-----------( 176      ( 01 Feb 2023 09:50 )             48.9     02-01    136  |  98  |  16  ----------------------------<  97  4.1   |  25  |  1.05    Ca    9.1      01 Feb 2023 09:50    TPro  7.2  /  Alb  4.4  /  TBili  0.5  /  DBili  x   /  AST  22  /  ALT  22  /  AlkPhos  61  02-01    proBNP: Serum Pro-Brain Natriuretic Peptide: 91 pg/mL (02-01 @ 09:50)    TSH: Pending         CHIEF COMPLAINT: Shortness of breath and atrial fibrillation with RVR    HISTORY OF PRESENT ILLNESS: 72 yr old male, PMH of HTN, CHLD, COPD, presents to the Emergency Room with shortness of breath and dry cough. Patient reports having mild SOB for few days which worsened overnight with no improvement after using home inhaler. Pt states that this morning, the SOB made it difficult for him to ambulate and perform his ADL's (otherwise he is independent w/ no assistance). Pt denies any chest pain, palpitations, any previous arrhythmias, fever dizziness, headache, blurry vision, swelling of extremities or recent falls. Pt states that he has a loop recorder ([employer]) that was placed over a year ago by his cardiologist in [place] concerned for "low heart rate", but denies any symptoms. Pt was a previous smoker w/ 35 yrs of 6ppd (stopped at 48 yrs old) and denies any ETOH or illicit drug use. Pt recently came back from [place] 1/21/2023.    In the ED: pt was in afib w/RVR up to 130's. Upon assessment, pt was rate controlled between 100-110 HR in Afib. Pt denied any chest pain, palpitation, headache, N/V, or dizziness. Endorses SOB, but is on room air saturating between 93-96% SpO2.     PAST MEDICAL & SURGICAL HISTORY:  HTN (hypertension)    HLD (hyperlipidemia)    COPD exacerbation        PREVIOUS DIAGNOSTIC TESTING:    Echocardiogram:  Pending      MEDICATIONS:                  FAMILY HISTORY:  None significant      SOCIAL HISTORY:      Smoker: previous smoker w/ 35 yrs of 6ppd (stopped at 48 yrs old)  [-] Alcohol  [-] Drugs    Allergies    No Known Allergies    Intolerances    	    REVIEW OF SYSTEMS:  CONSTITUTIONAL: No fever, weight loss, or fatigue  EYES: No eye pain, visual disturbances, or discharge  ENMT:  No difficulty hearing, tinnitus, vertigo; No sinus or throat pain  NECK: No pain or stiffness  RESPIRATORY: (+) for shortness of breath, dry cough, and mild wheezing  CARDIOVASCULAR: No chest pain, palpitations, passing out, dizziness, or leg swelling  GASTROINTESTINAL: No abdominal or epigastric pain. No nausea, vomiting, or hematemesis; No diarrhea or constipation. No melena or hematochezia.  GENITOURINARY: No dysuria, frequency, hematuria, or incontinence  NEUROLOGICAL: No headaches, memory loss, loss of strength, numbness, or tremors  SKIN: No itching, burning, rashes, or lesions   LYMPH Nodes: No enlarged glands  ENDOCRINE: No heat or cold intolerance; No hair loss  MUSCULOSKELETAL: No joint pain or swelling; No muscle, back, or extremity pain  PSYCHIATRIC: No depression, anxiety, mood swings, or difficulty sleeping  HEME/LYMPH: No easy bruising, or bleeding gums  ALLERY AND IMMUNOLOGIC: No hives or eczema	    [x] All others negative	  [ ] Unable to obtain    PHYSICAL EXAM:  T(C): 39.1 (02-01-23 @ 11:53), Max: 39.1 (02-01-23 @ 10:00)  HR: 116 (02-01-23 @ 11:53) (116 - 149)  BP: 145/89 (02-01-23 @ 10:00) (145/89 - 153/94)  RR: 30 (02-01-23 @ 10:00) (18 - 30)  SpO2: 100% (02-01-23 @ 10:00) (100% - 100%)  Wt(kg): --  I&O's Summary      Appearance: Normal	  HEENT:   Normal oral mucosa, PERRL, EOMI	  Lymphatic: No lymphadenopathy  Cardiovascular: Normal S1 S2, No JVD, No murmurs, No edema  Respiratory: Lungs clear to auscultation	  Psychiatry: A & O x 3, Mood & affect appropriate  Gastrointestinal:  Soft, Non-tender, + BS	  Skin: No rashes, No ecchymoses, No cyanosis	  Neurologic: Non-focal  Extremities: Normal range of motion, No clubbing, cyanosis or edema  Vascular: Peripheral pulses palpable 2+ bilaterally    TELEMETRY: Atrial fibrillation with VR 110s-130s 	    ECG:  	Atrial fibrillation with  bpm;   RADIOLOGY:  OTHER: 	  	  LABS:	 	    CARDIAC MARKERS:                        15.3   9.30  )-----------( 176      ( 01 Feb 2023 09:50 )             48.9     02-01    136  |  98  |  16  ----------------------------<  97  4.1   |  25  |  1.05    Ca    9.1      01 Feb 2023 09:50    TPro  7.2  /  Alb  4.4  /  TBili  0.5  /  DBili  x   /  AST  22  /  ALT  22  /  AlkPhos  61  02-01    proBNP: Serum Pro-Brain Natriuretic Peptide: 91 pg/mL (02-01 @ 09:50)    TSH: Pending

## 2023-02-01 NOTE — H&P ADULT - NSHPSOCIALHISTORY_GEN_ALL_CORE
Former heavy smoker, quit >10 years ago  No alcohol/drugs  Lives with wife in [place]. Visiting [place] for granddaughter's wedding in March  Lives active lifestyle, ambulates unassisted

## 2023-02-01 NOTE — ED PROVIDER NOTE - CLINICAL SUMMARY MEDICAL DECISION MAKING FREE TEXT BOX
71 y/o M PMHx HTN, HLD, COPD on steroids daily, visiting from [place], here c/o malaise/cough x few days, w/ worsening SOB last night. Concern for asthma/copd exacerbation due to URI, new onset afib noted likely 2/2 fever/URI/albuterol use. Plan labs, cultures, steroids/abx/anti pyretics, reassess

## 2023-02-01 NOTE — H&P ADULT - PROBLEM SELECTOR PLAN 5
Systolic 140s-150s  - c/w metoprolol Unclear hx of DM. Pt is supposed to be taking metformin but has not been adherent  - f/u A1c

## 2023-02-01 NOTE — ED PROVIDER NOTE - OBJECTIVE STATEMENT
71 y/o M PMHx HTN, HLD, COPD on steroids daily, visiting from [place], here c/o malaise/cough x few days, w/ worsening SOB last night. Has been using duonebs at home w/o relief. Pt states he used to live in [place], however moved to [place] to get better control of his COPD. Wife at bedside thinks weather change could have contributed. No hx of cardiac disease, but wife describes an implantable device to measure HR ?loop. Pt not on any AC. +SICK CONTACTS, wife w/ URI. 71 y/o M PMHx HTN, HLD, COPD on steroids daily, visiting from [place], here c/o malaise/cough x few days, w/ worsening SOB last night. Has been using duonebs at home w/o relief. Pt states he used to live in [place], however moved to [place] to get better control of his COPD. Wife at bedside thinks weather change could have contributed. No hx of cardiac disease, but wife describes an implantable device to measure HR ?loop. Pt not on any AC. +SICK CONTACTS, wife w/ URI. 71 y/o M PMHx HTN, HLD, COPD on steroids daily, visiting from [place], here c/o malaise/cough x few days, w/ worsening SOB last night. Has been using duonebs at home w/o relief. Pt states he used to live in [place], however moved to [place] to get better control of his COPD. Wife at bedside thinks weather change could have contributed. No hx of cardiac disease, but wife describes an implantable device to measure HR ?loop. Pt not on any AC. +SICK CONTACTS, wife w/ URI.

## 2023-02-01 NOTE — H&P ADULT - NSHPOUTPATIENTPROVIDERS_GEN_ALL_CORE
PCP Dr. Marie Acuña ([place]), Dr. Franki Wright (Cardiology/[place]) PCP Dr. Marie Acuña ([place]), Dr. Franki Wright (Cardiology/[place]) PCP Dr. Marie Acuña ([place]), Dr. Franki Wright (Cardiology/[place])

## 2023-02-01 NOTE — ED ADULT TRIAGE NOTE - CHIEF COMPLAINT QUOTE
pATIENT BROUGHT TO er BY ems FOR C/O SHORTNESS OF BREATH FOR THE PAST COUPLE OF DAYS. PT HAS A COUGH. pATIENT BROUGHT TO er BY ems FOR C/O SHORTNESS OF BREATH FOR THE PAST COUPLE OF DAYS. PT HAS A COUGH. Type 2 DM: FS: 100

## 2023-02-02 ENCOUNTER — TRANSCRIPTION ENCOUNTER (OUTPATIENT)
Age: 73
End: 2023-02-02

## 2023-02-02 VITALS
OXYGEN SATURATION: 94 % | TEMPERATURE: 99 F | DIASTOLIC BLOOD PRESSURE: 87 MMHG | HEART RATE: 100 BPM | SYSTOLIC BLOOD PRESSURE: 147 MMHG | RESPIRATION RATE: 20 BRPM

## 2023-02-02 LAB
A1C WITH ESTIMATED AVERAGE GLUCOSE RESULT: 6.5 % — HIGH (ref 4–5.6)
ANION GAP SERPL CALC-SCNC: 9 MMOL/L — SIGNIFICANT CHANGE UP (ref 7–14)
BUN SERPL-MCNC: 22 MG/DL — SIGNIFICANT CHANGE UP (ref 7–23)
CALCIUM SERPL-MCNC: 8.9 MG/DL — SIGNIFICANT CHANGE UP (ref 8.4–10.5)
CHLORIDE SERPL-SCNC: 103 MMOL/L — SIGNIFICANT CHANGE UP (ref 98–107)
CO2 SERPL-SCNC: 22 MMOL/L — SIGNIFICANT CHANGE UP (ref 22–31)
CREAT SERPL-MCNC: 0.99 MG/DL — SIGNIFICANT CHANGE UP (ref 0.5–1.3)
EGFR: 80 ML/MIN/1.73M2 — SIGNIFICANT CHANGE UP
ESTIMATED AVERAGE GLUCOSE: 140 — SIGNIFICANT CHANGE UP
GAS PNL BLDV: SIGNIFICANT CHANGE UP
GLUCOSE SERPL-MCNC: 112 MG/DL — HIGH (ref 70–99)
HCT VFR BLD CALC: 46.5 % — SIGNIFICANT CHANGE UP (ref 39–50)
HCV AB S/CO SERPL IA: 0.05 S/CO — SIGNIFICANT CHANGE UP (ref 0–0.99)
HCV AB SERPL-IMP: SIGNIFICANT CHANGE UP
HGB BLD-MCNC: 14.6 G/DL — SIGNIFICANT CHANGE UP (ref 13–17)
LACTATE SERPL-SCNC: 1.7 MMOL/L — SIGNIFICANT CHANGE UP (ref 0.5–2)
LACTATE SERPL-SCNC: 2.1 MMOL/L — HIGH (ref 0.5–2)
MAGNESIUM SERPL-MCNC: 2.2 MG/DL — SIGNIFICANT CHANGE UP (ref 1.6–2.6)
MCHC RBC-ENTMCNC: 27.4 PG — SIGNIFICANT CHANGE UP (ref 27–34)
MCHC RBC-ENTMCNC: 31.4 GM/DL — LOW (ref 32–36)
MCV RBC AUTO: 87.4 FL — SIGNIFICANT CHANGE UP (ref 80–100)
NRBC # BLD: 0 /100 WBCS — SIGNIFICANT CHANGE UP (ref 0–0)
NRBC # FLD: 0 K/UL — SIGNIFICANT CHANGE UP (ref 0–0)
PHOSPHATE SERPL-MCNC: 3.8 MG/DL — SIGNIFICANT CHANGE UP (ref 2.5–4.5)
PLATELET # BLD AUTO: 179 K/UL — SIGNIFICANT CHANGE UP (ref 150–400)
POTASSIUM SERPL-MCNC: 4.3 MMOL/L — SIGNIFICANT CHANGE UP (ref 3.5–5.3)
POTASSIUM SERPL-SCNC: 4.3 MMOL/L — SIGNIFICANT CHANGE UP (ref 3.5–5.3)
RBC # BLD: 5.32 M/UL — SIGNIFICANT CHANGE UP (ref 4.2–5.8)
RBC # FLD: 15.2 % — HIGH (ref 10.3–14.5)
SODIUM SERPL-SCNC: 134 MMOL/L — LOW (ref 135–145)
WBC # BLD: 10.16 K/UL — SIGNIFICANT CHANGE UP (ref 3.8–10.5)
WBC # FLD AUTO: 10.16 K/UL — SIGNIFICANT CHANGE UP (ref 3.8–10.5)

## 2023-02-02 PROCEDURE — 99239 HOSP IP/OBS DSCHRG MGMT >30: CPT | Mod: GC

## 2023-02-02 PROCEDURE — 99232 SBSQ HOSP IP/OBS MODERATE 35: CPT

## 2023-02-02 RX ORDER — AZITHROMYCIN 500 MG/1
1 TABLET, FILM COATED ORAL
Qty: 5 | Refills: 0
Start: 2023-02-02

## 2023-02-02 RX ORDER — APIXABAN 2.5 MG/1
1 TABLET, FILM COATED ORAL
Qty: 60 | Refills: 0
Start: 2023-02-02 | End: 2023-03-03

## 2023-02-02 RX ORDER — METOPROLOL TARTRATE 50 MG
1 TABLET ORAL
Qty: 60 | Refills: 0
Start: 2023-02-02 | End: 2023-03-03

## 2023-02-02 RX ADMIN — APIXABAN 5 MILLIGRAM(S): 2.5 TABLET, FILM COATED ORAL at 06:22

## 2023-02-02 RX ADMIN — AZITHROMYCIN 255 MILLIGRAM(S): 500 TABLET, FILM COATED ORAL at 11:34

## 2023-02-02 RX ADMIN — Medication 200 MILLIGRAM(S): at 17:12

## 2023-02-02 RX ADMIN — Medication 25 MILLIGRAM(S): at 06:23

## 2023-02-02 RX ADMIN — Medication 20 MILLIGRAM(S): at 06:23

## 2023-02-02 RX ADMIN — PANTOPRAZOLE SODIUM 40 MILLIGRAM(S): 20 TABLET, DELAYED RELEASE ORAL at 06:23

## 2023-02-02 RX ADMIN — Medication 1 MILLIGRAM(S): at 13:11

## 2023-02-02 RX ADMIN — Medication 100 MILLIGRAM(S): at 10:36

## 2023-02-02 RX ADMIN — Medication 100 MILLIGRAM(S): at 06:23

## 2023-02-02 RX ADMIN — Medication 100 MILLIGRAM(S): at 13:11

## 2023-02-02 RX ADMIN — Medication 100 MILLIGRAM(S): at 02:36

## 2023-02-02 RX ADMIN — MONTELUKAST 10 MILLIGRAM(S): 4 TABLET, CHEWABLE ORAL at 13:11

## 2023-02-02 NOTE — PATIENT PROFILE ADULT - FUNCTIONAL ASSESSMENT - BASIC MOBILITY 6.
4-calculated by average/Not able to assess (calculate score using [facility] averaging method)  4-calculated by average/Not able to assess (calculate score using [facility] averaging method)  4-calculated by average/Not able to assess (calculate score using [facility] averaging method)

## 2023-02-02 NOTE — DISCHARGE NOTE PROVIDER - NSDCCPCAREPLAN_GEN_ALL_CORE_FT
PRINCIPAL DISCHARGE DIAGNOSIS  Diagnosis: Infection due to human metapneumovirus (hMPV)  Assessment and Plan of Treatment: You were admitted for management of shortness of breath and fever. Your symptoms were caused by a virus called human metapneumovirus (hMPV), which can cause a stronger version of the common cold. You may have been more vulnerable to this virus due to your history of COPD. You were treated with IV fluids, anti-inflammatory medications, & respiratory treatments. Your fever resolved, and your shortness of breath improved to the point where you no longer needed supplemental oxygen. On discharge, you will be prescribed a short course of azithromycin, which can help reduce inflammation. Please continue to take your prednisone and other home medications for COPD as prescribed.  Please follow up with your PCP, cardiologist, and pulmonologist in [place] after leaving the hospital. It is very important that you attend these follow-up appointments and take all medications as prescribed on discharge.  If you develop new or worsening symptoms, such as chest pain, shortness of breath, fever, or chills, please call your doctor or go directly to the nearest emergency room.      SECONDARY DISCHARGE DIAGNOSES  Diagnosis: Atrial fibrillation  Assessment and Plan of Treatment: You were found to have a fast heart rate in the hospital. This abnormal heart rhythm is called atrial fibrillation. Your heart rate improved after receiving IV fluids and starting a medication called Metoprolol. At the time of discharge, you were back into a normal heart rate & rhythm. You underwent an echocardiogram (ultrasound of the heart) which showed good heart valves and mildly reduced heart function. You will be prescribed metoprolol tartrate 25 mg twice a day. In addition, you will be prescribed Eliquis 5 mg twice a day - this medication reduces stroke risk in patients with atrial fibrillation.  Please follow up with your cardiologist in [place] for further management.    Diagnosis: Diabetes mellitus  Assessment and Plan of Treatment: Your hemoglobin A1c, a measure of your blood sugar, was 6.5%. This is borderline for diabetes. Please continue to limit your sugar intake and exercise regularly. In addition, do not take metformin for the time being, and please discuss whether or not to continue this medication when you see your PCP in [place].     PRINCIPAL DISCHARGE DIAGNOSIS  Diagnosis: Infection due to human metapneumovirus (hMPV)  Assessment and Plan of Treatment: You were admitted for management of shortness of breath and fever. Your symptoms were caused by a virus called human metapneumovirus (hMPV), which can cause a stronger version of the common cold. You may have been more vulnerable to this virus due to your history of COPD. You were treated with IV fluids, anti-inflammatory medications, & respiratory treatments. Your fever resolved, and your shortness of breath improved to the point where you no longer needed supplemental oxygen. On discharge, you will be prescribed a short course of azithromycin, which can help reduce inflammation. Please continue to take your prednisone and other home medications for COPD as prescribed.  Please follow up with your PCP, cardiologist, and pulmonologist in [place] after leaving the hospital. It is very important that you attend these follow-up appointments and take all medications as prescribed on discharge.  If you develop new or worsening symptoms, such as chest pain, shortness of breath, fever, or chills, please call your doctor or go directly to the nearest emergency room.      SECONDARY DISCHARGE DIAGNOSES  Diagnosis: Atrial fibrillation  Assessment and Plan of Treatment: You were found to have a fast heart rate in the hospital. This abnormal heart rhythm is called atrial fibrillation. Your heart rate improved after receiving IV fluids and starting a medication called Metoprolol. At the time of discharge, you were back into a normal heart rate & rhythm. You underwent an echocardiogram (ultrasound of the heart) which showed good heart valves and mildly reduced heart function. You will be prescribed metoprolol tartrate 25 mg twice a day. In addition, you will be prescribed Eliquis 5 mg twice a day - this medication reduces stroke risk in patients with atrial fibrillation.  Please follow up with your cardiologist in [place] for further management.    Diagnosis: Diabetes mellitus  Assessment and Plan of Treatment: Your hemoglobin A1c, a measure of your blood sugar, was 6.5%. This is borderline for diabetes. Please continue to limit your sugar intake and exercise regularly. In addition, do not take metformin for the time being, and please discuss whether or not to continue this medication when you see your PCP in [place].     PRINCIPAL DISCHARGE DIAGNOSIS  Diagnosis: Infection due to human metapneumovirus (hMPV)  Assessment and Plan of Treatment: You were admitted for management of shortness of breath and fever. Your symptoms were caused by a virus called human metapneumovirus (hMPV), which can cause a stronger version of the common cold. You may have been more vulnerable to this virus due to your history of COPD. You were treated with IV fluids, anti-inflammatory medications, & respiratory treatments. Your fever resolved, and your shortness of breath improved to the point where you no longer needed supplemental oxygen. On discharge, you will be prescribed a short course of azithromycin, which can help reduce inflammation. Please continue to take your prednisone and other home medications for COPD as prescribed.  Please follow up with your PCP, cardiologist, and pulmonologist in [place] after leaving the hospital. It is very important that you attend these follow-up appointments and take all medications as prescribed on discharge.  If you develop new or worsening symptoms, such as chest pain, shortness of breath, fever, or chills, please call your doctor or go directly to the nearest emergency room.      SECONDARY DISCHARGE DIAGNOSES  Diagnosis: Atrial fibrillation  Assessment and Plan of Treatment: You were found to have a fast heart rate in the hospital. This abnormal heart rhythm is called atrial fibrillation. Your heart rate improved after receiving IV fluids and starting a medication called Metoprolol. At the time of discharge, you were back into a normal heart rate & rhythm. You underwent an echocardiogram (ultrasound of the heart) which showed good heart valves and mildly reduced heart function. You will be prescribed metoprolol tartrate 25 mg twice a day. In addition, you will be prescribed Eliquis 5 mg twice a day - this medication reduces stroke risk in patients with atrial fibrillation.  Please follow up with your cardiologist in [place] for further management.    Diagnosis: Diabetes mellitus  Assessment and Plan of Treatment: Your hemoglobin A1c, a measure of your blood sugar, was 6.5%. This is borderline for diabetes. Please continue to limit your sugar intake and exercise regularly. In addition, do not take metformin for the time being, and please discuss whether or not to continue this medication when you see your PCP in [place].

## 2023-02-02 NOTE — DISCHARGE NOTE PROVIDER - CARE PROVIDER_API CALL
Marie Salcido [place]  Phone: (   )    -  Fax: (   )    -  Established Patient  Follow Up Time: 1 week    Franki Wright [place]  Phone: (   )    -  Fax: (   )    -  Established Patient  Follow Up Time: 2 weeks   Marie Salcido [place]  Phone: (   )    -  Fax: (   )    -  Established Patient  Follow Up Time: 1 week    Franki Wright [place]  Phone: (   )    -  Fax: (   )    -  Established Patient  Follow Up Time: 2 weeks   Marie Salcido [place]  Phone: (   )    -  Fax: (   )    -  Established Patient  Follow Up Time: 1 week    Franki Wright [place]  Phone: (   )    -  Fax: (   )    -  Established Patient  Follow Up Time: 2 weeks

## 2023-02-02 NOTE — DISCHARGE NOTE PROVIDER - NSDCCPTREATMENT_GEN_ALL_CORE_FT
PRINCIPAL PROCEDURE  Procedure: Transthoracic echocardiography (TTE)  Findings and Treatment: 2/1/23  DIMENSIONS:  Dimensions:     Normal Values:  LA:     3.5 cm    2.0 - 4.0 cm  Ao:     3.3 cm    2.0 - 3.8 cm  SEPTUM: 0.8 cm    0.6 - 1.2 cm  PWT:    0.8 cm    0.6 - 1.1 cm  LVIDd:  4.5 cm    3.0 - 5.6 cm  LVIDs:  3.4 cm    1.8 - 4.0 cm  Derived Variables:  LVMI: 54 g/m2  RWT: 0.35  Fractional short: 24 %  Ejection Fraction (Modified Kaplan Rule): 49 %  ------------------------------------------------------------------------  OBSERVATIONS:  Mitral Valve: Mitral annular calcification, otherwise  normal mitral valve. Mild mitral regurgitation.  Aortic Root: Normal aortic root.  Aortic Valve: Aortic valve leaflet morphology not well  visualized.  Left Atrium: Normal left atrium.  LA volume index = 24  cc/m2.  Left Ventricle: Mild global left ventricular systolic  dysfunction. Normal left ventricular internal dimensions  and wall thicknesses.  Right Heart: Normal right atrium. Normal right ventricular  size and function. Normal tricuspid valve. Minimal  tricuspid regurgitation. Normal pulmonic valve.  Pericardium/PleuraNormal pericardium with no pericardial  effusion.  ------------------------------------------------------------------------  CONCLUSIONS:  1. Mitral annular calcification, otherwise normal mitral  valve. Mild mitral regurgitation.  2. Normal left ventricular internal dimensions and wall  thicknesses.  3. Mild global left ventricular systolic dysfunction.  4. Normal right ventricular size and function.      SECONDARY PROCEDURE  Procedure: XR chest, 1 view  Findings and Treatment: 2/1/23  FINDINGS:  No focal opacity. No pleural effusion or pneumothorax.  The heart is normal in size. Cardiac loop recorder in place.  No acute osseous findings.  IMPRESSION:  No focal opacity.

## 2023-02-02 NOTE — PROGRESS NOTE ADULT - ASSESSMENT
MrJosefina Borrero is a 73 yo M visiting from [place], PMH COPD on daily oral steroids, HTN, HLD, RA, presenting with URI symptoms, SOB x1 day. Found to have sepsis 2/2 hMPV c/b new AFib with RVR.   MrJosefina Borrero is a 71 yo M visiting from [place], PMH COPD on daily oral steroids, HTN, HLD, RA, presenting with URI symptoms, SOB x1 day. Found to have sepsis 2/2 hMPV c/b new AFib with RVR.   MrJosefina Borrero is a 73 yo M visiting from [place], PMH COPD on daily oral steroids, HTN, HLD, RA, presenting with URI symptoms, SOB x1 day. Found to have sepsis 2/2 hMPV c/b new AFib with RVR.

## 2023-02-02 NOTE — PROGRESS NOTE ADULT - PROBLEM SELECTOR PLAN 3
New onset AF RVR on admission, tachy to 140s. Improved rate control following fluid resuscitation.   - pt has implanted loop recorder (~1.5 yrs prior) for "low heart rate," pt believes HR was in 40s-50s. No hx syncope  - admit to tele  - f/u TTE  - f/u TSH/T4  - metoprolol tartrate 25 mg bid  - Eliquis 5 bid  - EP recs appreciated New onset AF RVR on admission, tachy to 140s. Improved rate control following fluid resuscitation.   - pt has implanted loop recorder (~1.5 yrs prior) for "low heart rate," pt believes HR was in 40s-50s. No hx syncope  - converted to NSR overnight  - TTE: no valve dz, EF 49% mild global LV systolic dysfunction  - TFTs wnl  - metoprolol tartrate 25 mg bid  - Eliquis 5 bid  - tele  - EP recs appreciated

## 2023-02-02 NOTE — PROGRESS NOTE ADULT - PROBLEM SELECTOR PLAN 8
DVT ppx: Eliquis  GI ppx: pantoprazole  Diet: DASH/CC  Code status: DNR/DNI molst in chart  Dispo: pending PT [place]bin DVT ppx: Eliquis  GI ppx: pantoprazole  Diet: DASH/CC  Code status: DNR/DNI molst in chart  Dispo: Home, no needs

## 2023-02-02 NOTE — PROGRESS NOTE ADULT - SUBJECTIVE AND OBJECTIVE BOX
Interval history:  No acute overnight event  Tele self converted to SR   Denies palpitation, CP, SOB, dizziness.       PAST MEDICAL & SURGICAL HISTORY:  HTN (hypertension)    HLD (hyperlipidemia)    COPD exacerbation    No significant past surgical history        MEDICATIONS  (STANDING):  apixaban 5 milliGRAM(s) Oral every 12 hours  azithromycin  IVPB 500 milliGRAM(s) IV Intermittent every 24 hours  benzonatate 100 milliGRAM(s) Oral three times a day  folic acid 1 milliGRAM(s) Oral daily  metoprolol tartrate 25 milliGRAM(s) Oral two times a day  montelukast 10 milliGRAM(s) Oral daily  pantoprazole    Tablet 40 milliGRAM(s) Oral before breakfast  predniSONE   Tablet 20 milliGRAM(s) Oral daily  simvastatin 20 milliGRAM(s) Oral at bedtime    MEDICATIONS  (PRN):  acetaminophen     Tablet .. 650 milliGRAM(s) Oral every 6 hours PRN Temp greater or equal to 38C (100.4F), Mild Pain (1 - 3)  guaiFENesin Oral Liquid (Sugar-Free) 100 milliGRAM(s) Oral every 6 hours PRN Cough  ipratropium    for Nebulization 500 MICROGram(s) Nebulizer every 6 hours PRN Shortness of Breath and/or Wheezing            Vital Signs Last 24 Hrs  T(C): 37 (2023 06:20), Max: 39.1 (2023 10:00)  T(F): 98.6 (2023 06:20), Max: 102.3 (2023 10:00)  HR: 99 (2023 06:20) (98 - 149)  BP: 130/72 (2023 06:20) (111/68 - 145/89)  BP(mean): --  RR: 16 (2023 06:20) (16 - 30)  SpO2: 96% (2023 06:20) (96% - 100%)    Parameters below as of 2023 06:20  Patient On (Oxygen Delivery Method): room air                INTERPRETATION OF TELEMETRY: Afib and self converted to SR overnight    ECG:        LABS:                        14.6   10.16 )-----------( 179      ( 2023 06:00 )             46.5     02-02    134<L>  |  103  |  22  ----------------------------<  112<H>  4.3   |  22  |  0.99    Ca    8.9      2023 06:00  Phos  3.8     02  Mg     2.20         TPro  7.2  /  Alb  4.4  /  TBili  0.5  /  DBili  x   /  AST  22  /  ALT  22  /  AlkPhos  61          PT/INR - ( 2023 09:50 )   PT: 12.5 sec;   INR: 1.08 ratio         PTT - ( 2023 09:50 )  PTT:24.9 sec  Urinalysis Basic - ( 2023 13:00 )    Color: Light Yellow / Appearance: Clear / S.020 / pH: x  Gluc: x / Ketone: Negative  / Bili: Negative / Urobili: <2 mg/dL   Blood: x / Protein: Trace / Nitrite: Negative   Leuk Esterase: Negative / RBC: x / WBC x   Sq Epi: x / Non Sq Epi: x / Bacteria: x      I&O's Summary    BNP  RADIOLOGY & ADDITIONAL STUDIES:      PHYSICAL EXAM:    GENERAL: In no apparent distress, well nourished, and hydrated.  HEART: Regular rate and rhythm; No murmurs, rubs, or gallops.  PULMONARY: Clear to auscultation and percussion.  No rales, wheezing, or rhonchi bilaterally.  ABDOMEN: Soft, Nontender, Nondistended; Bowel sounds present  EXTREMITIES:  Peripheral Pulses present, No clubbing, cyanosis, or edema  NEUROLOGICAL: Grossly nonfocal

## 2023-02-02 NOTE — DISCHARGE NOTE PROVIDER - NSDCMRMEDTOKEN_GEN_ALL_CORE_FT
Albuterol (Eqv-Proventil HFA) 90 mcg/inh inhalation aerosol: 2 puff(s) inhaled every 6 hours, As Needed  Breztri Aerosphere inhalation aerosol: 2 puff(s) inhaled 2 times a day  Eliquis 5 mg oral tablet: 1 tab(s) orally 2 times a day   folic acid 1 mg oral tablet: 1 tab(s) orally once a day  methotrexate 2.5 mg oral tablet: 5 tab(s) orally once a week  montelukast 10 mg oral tablet: 1 tab(s) orally once a day  nabumetone 750 mg oral tablet: 1 tab(s) orally once a day, As Needed  omeprazole 20 mg oral delayed release tablet: 1 tab(s) orally once a day  predniSONE 20 mg oral tablet: 1 tab(s) orally once a day  simvastatin 20 mg oral tablet: 1 tab(s) orally once a day (at bedtime)  traMADol 50 mg oral tablet: 1 tab(s) orally every 6 hours, As Needed   Albuterol (Eqv-Proventil HFA) 90 mcg/inh inhalation aerosol: 2 puff(s) inhaled every 6 hours, As Needed  apixaban 5 mg oral tablet: 1 tab(s) orally every 12 hours  Azithromycin 5 Day Dose Pack 250 mg oral tablet: 1 tab(s) orally once a day   Breztri Aerosphere inhalation aerosol: 2 puff(s) inhaled 2 times a day  folic acid 1 mg oral tablet: 1 tab(s) orally once a day  methotrexate 2.5 mg oral tablet: 5 tab(s) orally once a week  Metoprolol Tartrate 25 mg oral tablet: 1 tab(s) orally 2 times a day  montelukast 10 mg oral tablet: 1 tab(s) orally once a day  nabumetone 750 mg oral tablet: 1 tab(s) orally once a day, As Needed  omeprazole 20 mg oral delayed release tablet: 1 tab(s) orally once a day  predniSONE 20 mg oral tablet: 1 tab(s) orally once a day  simvastatin 20 mg oral tablet: 1 tab(s) orally once a day (at bedtime)  Tessalon Perles 100 mg oral capsule: 1 cap(s) orally 3 times a day  traMADol 50 mg oral tablet: 1 tab(s) orally every 6 hours, As Needed

## 2023-02-02 NOTE — PHYSICAL THERAPY INITIAL EVALUATION ADULT - PERTINENT HX OF CURRENT PROBLEM, REHAB EVAL
73 yo M visiting from [place], PMH COPD on daily oral steroids, HTN, HLD, RA, presenting with URI symptoms, SOB x1 day. Found to have sepsis 2/2 hMPV c/b new AFib with RVR. 71 yo M visiting from [place], PMH COPD on daily oral steroids, HTN, HLD, RA, presenting with URI symptoms, SOB x1 day. Found to have sepsis 2/2 hMPV c/b new AFib with RVR.

## 2023-02-02 NOTE — DISCHARGE NOTE PROVIDER - HOSPITAL COURSE
Discharge Summary     Admission diagnoses:   ***    Hospital Course:   For full details, please see H&P, progress notes, consult notes, and ancillary notes. Briefly, Mr. Nic Borrero is a 72 year old Male, visiting from [place], with a history of COPD on daily oral steroids, HTN, HLD, RA, presenting with URI symptoms, SOB x1 day. Found to have sepsis 2/2 hMPV c/b new AFib with RVR. The patient's hospital course will be summarized in a problem based format.    #human metapneumovirus  #COPD  Pt admitted with sepsis (SIRS+ fever, tachycardia, tachypnea) 2/2 human metapneumovirus. Pt briefly required supplemental O2 for increased work of breathing, but was weaned to room air while still in the ED. Received 1 dose of ceftriaxone & 1 dose of IV solumedrol, then observed off antibiotics. Treated symptomatically for cough. Pt's fever & tachycardia resolved shortly after admission. Pt was resumed on his home prednisone dose (20 mg daily). Pt instructed to complete a short course of oral azithromycin to reduce inflammation, as well as continue taking his prescribed COPD medications & inhalers. Pt instructed to f/u with his PCP & pulmonologist when he returns to [place] in the next 1-2 weeks.    #AFib w RVR  Pt noted to be tachycardic to 140s on arrival, in AFib. Of note, pt has hx of implanted loop recorder but no known hx AFib. Heart rate improved following fluid resuscitation. Evaluated by EP, started on metoprolol 25 bid & Eliquis 5 bid (CHADSVASC 2). Converted back to NSR. TTE showed normal valves, mild global LV systolic dysfunction (EF 49%). Pt instructed to f/u with his cardiologist when he returns to [place].    #DM  A1c 6.5. Euglycemic in the hospital. Pt instructed to hold metformin for now, can discuss with his PCP when he returns to [place].    On day of discharge, patient is clinically stable with no new exam findings or acute symptoms compared to prior. The patient was seen by the attending physician on the date of discharge and deemed stable and acceptable for discharge. The patient's chronic medical conditions were treated accordingly per the patient's home medication regimen. The patient's medication reconciliation, follow up appointments, discharge instructions, and significant lab and diagnostic studies are as noted.     Discharge follow up action items:     1. Follow up in [place] with PCP (Dr. Acuña), cardiologist (Dr. Wright), pulmonologist  2. Medication changes:  	Please START taking:  		- Metoprolol tartrate 25 mg bid  		- Eliquis 5 mg bid  		- Azithromycin 500 mg daily x5 days  	Please STOP taking:  		- Metformin  Please continue to take all other medications as prescribed.    Patient's ordered code status: DNR/DNI    Patient disposition: Home     Discharge Summary     Admission diagnoses:   ***    Hospital Course:   For full details, please see H&P, progress notes, consult notes, and ancillary notes. Briefly, Mr. Nic Borrero is a 72 year old Male, visiting from [place], with a history of COPD on daily oral steroids, HTN, HLD, RA, presenting with URI symptoms, SOB x1 day. Found to have sepsis 2/2 hMPV c/b new AFib with RVR. The patient's hospital course will be summarized in a problem based format.    #human metapneumovirus  #COPD  Pt admitted with sepsis (SIRS+ fever, tachycardia, tachypnea) 2/2 human metapneumovirus. Pt briefly required supplemental O2 for increased work of breathing, but was weaned to room air while still in the ED. Received 1 dose of ceftriaxone & 1 dose of IV solumedrol, then observed off antibiotics. Treated symptomatically for cough. Pt's fever & tachycardia resolved shortly after admission. Pt was resumed on his home prednisone dose (20 mg daily). Pt instructed to complete a short course of oral azithromycin to reduce inflammation, as well as continue taking his prescribed COPD medications & inhalers. Pt instructed to f/u with his PCP & pulmonologist when he returns to [place] in the next 1-2 weeks.    #AFib w RVR  Pt noted to be tachycardic to 140s on arrival, in AFib. Of note, pt has hx of implanted loop recorder but no known hx AFib. Heart rate improved following fluid resuscitation. Evaluated by EP, started on metoprolol 25 bid & Eliquis 5 bid (CHADSVASC 2). Converted back to NSR. TTE showed normal valves, mild global LV systolic dysfunction (EF 49%). Pt instructed to f/u with his cardiologist when he returns to [place].    #DM  A1c 6.5. Euglycemic in the hospital. Pt instructed to hold metformin for now, can discuss with his PCP when he returns to [place].    On day of discharge, patient is clinically stable with no new exam findings or acute symptoms compared to prior. The patient was seen by the attending physician on the date of discharge and deemed stable and acceptable for discharge. The patient's chronic medical conditions were treated accordingly per the patient's home medication regimen. The patient's medication reconciliation, follow up appointments, discharge instructions, and significant lab and diagnostic studies are as noted.     Discharge follow up action items:     1. Follow up in [place] with PCP (Dr. Acuña), cardiologist (Dr. Wright), pulmonologist  2. Medication changes:  	Please START taking:  		- Metoprolol tartrate 25 mg bid  		- Eliquis 5 mg bid  		- Azithromycin 500 mg daily x5 days  	Please STOP taking:  		- Metformin  Please continue to take all other medications as prescribed.    Patient's ordered code status: DNR/DNI    Patient disposition: Home     Discharge Summary     Admission diagnoses:   ***    Hospital Course:   For full details, please see H&P, progress notes, consult notes, and ancillary notes. Briefly, Mr. Nic Borrero is a 72 year old Male, visiting from [place], with a history of COPD on daily oral steroids, HTN, HLD, RA, presenting with URI symptoms, SOB x1 day. Found to have sepsis 2/2 hMPV c/b new AFib with RVR. The patient's hospital course will be summarized in a problem based format.    #human metapneumovirus  #COPD  Pt admitted with sepsis (SIRS+ fever, tachycardia, tachypnea) 2/2 human metapneumovirus. Pt briefly required supplemental O2 for increased work of breathing, but was weaned to room air while still in the ED. Received 1 dose of ceftriaxone & 1 dose of IV solumedrol, then observed off antibiotics. Treated symptomatically for cough. Pt's fever & tachycardia resolved shortly after admission. Pt was resumed on his home prednisone dose (20 mg daily). Pt instructed to complete a short course of oral azithromycin to reduce inflammation, as well as continue taking his prescribed COPD medications & inhalers. Pt instructed to f/u with his PCP & pulmonologist when he returns to [place] in the next 1-2 weeks.    #AFib w RVR  Pt noted to be tachycardic to 140s on arrival, in AFib. Of note, pt has hx of implanted loop recorder but no known hx AFib. Heart rate improved following fluid resuscitation. Evaluated by EP, started on metoprolol 25 bid & Eliquis 5 bid (CHADSVASC 2). Converted back to NSR. TTE showed normal valves, mild global LV systolic dysfunction (EF 49%). Pt instructed to f/u with his cardiologist when he returns to [place].    #DM  A1c 6.5. Euglycemic in the hospital. Pt instructed to hold metformin for now, can discuss with his PCP when he returns to [place].    On day of discharge, patient is clinically stable with no new exam findings or acute symptoms compared to prior. The patient was seen by the attending physician on the date of discharge and deemed stable and acceptable for discharge. The patient's chronic medical conditions were treated accordingly per the patient's home medication regimen. The patient's medication reconciliation, follow up appointments, discharge instructions, and significant lab and diagnostic studies are as noted.     Discharge follow up action items:     1. Follow up in [place] with PCP (Dr. Acuña), cardiologist (Dr. Wright), pulmonologist  2. Medication changes:  	Please START taking:  		- Metoprolol tartrate 25 mg bid  		- Eliquis 5 mg bid  		- Azithromycin 500 mg daily x5 days  	Please STOP taking:  		- Metformin  Please continue to take all other medications as prescribed.    Patient's ordered code status: DNR/DNI    Patient disposition: Home     Discharge Summary     Admission diagnoses:   human metapneumovirus  AFib    Hospital Course:   For full details, please see H&P, progress notes, consult notes, and ancillary notes. Briefly, Mr. Nic Borrero is a 72 year old Male, visiting from [place], with a history of COPD on daily oral steroids, HTN, HLD, RA, presenting with URI symptoms, SOB x1 day. Found to have sepsis 2/2 hMPV c/b new AFib with RVR. The patient's hospital course will be summarized in a problem based format.    #human metapneumovirus  #COPD  Pt admitted with sepsis (SIRS+ fever, tachycardia, tachypnea) 2/2 human metapneumovirus. Pt briefly required supplemental O2 for increased work of breathing, but was weaned to room air while still in the ED. Received 1 dose of ceftriaxone & 1 dose of IV solumedrol, then observed off antibiotics. Treated symptomatically for cough. Pt's fever & tachycardia resolved shortly after admission. Pt was resumed on his home prednisone dose (20 mg daily). Pt instructed to complete a short course of oral azithromycin to reduce inflammation, as well as continue taking his prescribed COPD medications & inhalers. Pt instructed to f/u with his PCP & pulmonologist when he returns to [place] in the next 1-2 weeks.    #AFib w RVR  Pt noted to be tachycardic to 140s on arrival, in AFib. Of note, pt has hx of implanted loop recorder but no known hx AFib. Heart rate improved following fluid resuscitation. Evaluated by EP, started on metoprolol 25 bid & Eliquis 5 bid (CHADSVASC 2). Converted back to NSR. TTE showed normal valves, mild global LV systolic dysfunction (EF 49%). Pt instructed to f/u with his cardiologist when he returns to [place].    #DM  A1c 6.5. Euglycemic in the hospital. Pt instructed to hold metformin for now, can discuss with his PCP when he returns to [place].    On day of discharge, patient is clinically stable with no new exam findings or acute symptoms compared to prior. The patient was seen by the attending physician on the date of discharge and deemed stable and acceptable for discharge. The patient's chronic medical conditions were treated accordingly per the patient's home medication regimen. The patient's medication reconciliation, follow up appointments, discharge instructions, and significant lab and diagnostic studies are as noted.     Discharge follow up action items:     1. Follow up in [place] with PCP (Dr. Acuña), cardiologist (Dr. Wright), pulmonologist  2. Medication changes:  	Please START taking:  		- Metoprolol tartrate 25 mg bid  		- Eliquis 5 mg bid  		- Azithromycin 500 mg daily x5 days  	Please STOP taking:  		- Metformin  Please continue to take all other medications as prescribed.    Patient's ordered code status: DNR/DNI    Patient disposition: Home     Discharge Summary     Admission diagnoses:   human metapneumovirus  AFib    Hospital Course:   For full details, please see H&P, progress notes, consult notes, and ancillary notes. Briefly, Mr. Nic Borrero is a 72 year old Male, visiting from [place], with a history of COPD on daily oral steroids, HTN, HLD, RA, presenting with URI symptoms, SOB x1 day. Found to have sepsis 2/2 hMPV c/b new AFib with RVR. The patient's hospital course will be summarized in a problem based format.    #human metapneumovirus  #COPD  Pt admitted with sepsis (SIRS+ fever, tachycardia, tachypnea) 2/2 human metapneumovirus. Pt briefly required supplemental O2 for increased work of breathing, but was weaned to room air while still in the ED. Received 1 dose of ceftriaxone & 1 dose of IV solumedrol, then observed off antibiotics. Treated symptomatically for cough. Pt's fever & tachycardia resolved shortly after admission. Pt was resumed on his home prednisone dose (20 mg daily). Pt instructed to complete a short course of oral azithromycin to reduce inflammation, as well as continue taking his prescribed COPD medications & inhalers. Pt instructed to f/u with his PCP & pulmonologist when he returns to [place] in the next 1-2 weeks.    #AFib w RVR  Pt noted to be tachycardic to 140s on arrival, in AFib. Of note, pt has hx of implanted loop recorder but no known hx AFib. Heart rate improved following fluid resuscitation. Evaluated by EP, started on metoprolol 25 bid & Eliquis 5 bid (CHADSVASC 2). Converted back to NSR. TTE showed normal valves, mild global LV systolic dysfunction (EF 49%). Pt instructed to f/u with his cardiologist when he returns to [place].    #DM  A1c 6.5. Euglycemic in the hospital. Pt instructed to hold metformin for now, can discuss with his PCP when he returns to [place].    On day of discharge, patient is clinically stable with no new exam findings or acute symptoms compared to prior. The patient was seen by the attending physician on the date of discharge and deemed stable and acceptable for discharge. The patient's chronic medical conditions were treated accordingly per the patient's home medication regimen. The patient's medication reconciliation, follow up appointments, discharge instructions, and significant lab and diagnostic studies are as noted.     Discharge follow up action items:     1. Follow up in [place] with PCP (Dr. Acuña), cardiologist (Dr. Wright), pulmonologist  2. Medication changes:  	Please START taking:  		- Metoprolol tartrate 25 mg bid  		- Eliquis 5 mg bid  		- Azithromycin 500 mg daily x5 days  	Please STOP taking:  		- Metformin  Please continue to take all other medications as prescribed.    Patient's ordered code status: DNR/DNI    Patient disposition: Home     Discharge Summary     Admission diagnoses:   human metapneumovirus  AFib    Hospital Course:   For full details, please see H&P, progress notes, consult notes, and ancillary notes. Briefly, Mr. Nic Borrero is a 72 year old Male, visiting from [place], with a history of COPD on daily oral steroids, HTN, HLD, RA, presenting with URI symptoms, SOB x1 day. Found to have sepsis 2/2 hMPV c/b new AFib with RVR. The patient's hospital course will be summarized in a problem based format.    #human metapneumovirus  #COPD  Pt admitted with sepsis (SIRS+ fever, tachycardia, tachypnea) 2/2 human metapneumovirus. Pt briefly required supplemental O2 for increased work of breathing, but was weaned to room air while still in the ED. Received 1 dose of ceftriaxone & 1 dose of IV solumedrol, then observed off antibiotics. Treated symptomatically for cough. Pt's fever & tachycardia resolved shortly after admission. Pt was resumed on his home prednisone dose (20 mg daily). Pt instructed to complete a short course of oral azithromycin to reduce inflammation, as well as continue taking his prescribed COPD medications & inhalers. Pt instructed to f/u with his PCP & pulmonologist when he returns to [place] in the next 1-2 weeks.    #AFib w RVR  Pt noted to be tachycardic to 140s on arrival, in AFib. Of note, pt has hx of implanted loop recorder but no known hx AFib. Heart rate improved following fluid resuscitation. Evaluated by EP, started on metoprolol 25 bid & Eliquis 5 bid (CHADSVASC 2). Converted back to NSR. TTE showed normal valves, mild global LV systolic dysfunction (EF 49%). Pt instructed to f/u with his cardiologist when he returns to [place].    #DM  A1c 6.5. Euglycemic in the hospital. Pt instructed to hold metformin for now, can discuss with his PCP when he returns to [place].    On day of discharge, patient is clinically stable with no new exam findings or acute symptoms compared to prior. The patient was seen by the attending physician on the date of discharge and deemed stable and acceptable for discharge. The patient's chronic medical conditions were treated accordingly per the patient's home medication regimen. The patient's medication reconciliation, follow up appointments, discharge instructions, and significant lab and diagnostic studies are as noted.     Discharge follow up action items:     1. Follow up in [place] with PCP (Dr. Acuña), cardiologist (Dr. Wright), pulmonologist  2. Medication changes:  	Please START taking:  		- Metoprolol tartrate 25 mg bid  		- Eliquis 5 mg bid  		- Azithromycin 500 mg daily x5 days  		- tessalon perles TID x5 days  	Please STOP taking:  		- Metformin  Please continue to take all other medications as prescribed.    Patient's ordered code status: DNR/DNI    Patient disposition: Home     Discharge Summary     Admission diagnoses:   human metapneumovirus  AFib    Hospital Course:   For full details, please see H&P, progress notes, consult notes, and ancillary notes. Briefly, Mr. Nic Borrero is a 72 year old Male, visiting from [place], with a history of COPD on daily oral steroids, HTN, HLD, RA, presenting with URI symptoms, SOB x1 day. Found to have sepsis 2/2 hMPV c/b new AFib with RVR. The patient's hospital course will be summarized in a problem based format.    #human metapneumovirus  #COPD  Pt admitted with sepsis (SIRS+ fever, tachycardia, tachypnea) 2/2 human metapneumovirus. Pt briefly required supplemental O2 for increased work of breathing, but was weaned to room air while still in the ED. Received 1 dose of ceftriaxone & 1 dose of IV solumedrol, then observed off antibiotics. Treated symptomatically for cough. Pt's fever & tachycardia resolved shortly after admission. Pt was resumed on his home prednisone dose (20 mg daily). Pt instructed to complete a short course of oral azithromycin to reduce inflammation, as well as continue taking his prescribed COPD medications & inhalers. Pt instructed to f/u with his PCP & pulmonologist when he returns to [place] in the next 1-2 weeks.    #AFib w RVR  Pt noted to be tachycardic to 140s on arrival, in AFib. Of note, pt has hx of implanted loop recorder but no known hx AFib. Heart rate improved following fluid resuscitation. Evaluated by EP, started on metoprolol 25 bid & Eliquis 5 bid (CHADSVASC 2). Converted back to NSR. TTE showed normal valves, mild global LV systolic dysfunction (EF 49%). Pt instructed to f/u with his cardiologist when he returns to [place].    #DM  A1c 6.5. Euglycemic in the hospital. Pt instructed to hold metformin for now, can discuss with his PCP when he returns to [place].    On day of discharge, patient is clinically stable with no new exam findings or acute symptoms compared to prior. The patient was seen by the attending physician on the date of discharge and deemed stable and acceptable for discharge. The patient's chronic medical conditions were treated accordingly per the patient's home medication regimen. The patient's medication reconciliation, follow up appointments, discharge instructions, and significant lab and diagnostic studies are as noted.     Discharge follow up action items:     1. Follow up in [place] with PCP (Dr. Acuña), cardiologist (Dr. Wright), pulmonologist  2. Medication changes:  	Please START taking:  		- Metoprolol tartrate 25 mg bid  		- Eliquis 5 mg bid  		- Azithromycin 500 mg daily x5 days  		- tessalon perles TID x5 days  	Please STOP taking:  		- Metformin  Please continue to take all other medications as prescribed.    Patient's ordered code status: DNR/DNI    Patient disposition: Home     Discharge Summary     Admission diagnoses:   human metapneumovirus  AFib    Hospital Course:   For full details, please see H&P, progress notes, consult notes, and ancillary notes. Briefly, Mr. Nic Borrero is a 72 year old Male, visiting from [place], with a history of COPD on daily oral steroids, HTN, HLD, RA, presenting with URI symptoms, SOB x1 day. Found to have sepsis 2/2 hMPV c/b new AFib with RVR. The patient's hospital course will be summarized in a problem based format.    #human metapneumovirus  #COPD  Pt admitted with sepsis (SIRS+ fever, tachycardia, tachypnea) 2/2 human metapneumovirus. Pt briefly required supplemental O2 for increased work of breathing, but was weaned to room air while still in the ED. Received 1 dose of ceftriaxone & 1 dose of IV solumedrol, then observed off antibiotics. Treated symptomatically for cough. Pt's fever & tachycardia resolved shortly after admission. Pt was resumed on his home prednisone dose (20 mg daily). Pt instructed to complete a short course of oral azithromycin to reduce inflammation, as well as continue taking his prescribed COPD medications & inhalers. Pt instructed to f/u with his PCP & pulmonologist when he returns to [place] in the next 1-2 weeks.    #AFib w RVR  Pt noted to be tachycardic to 140s on arrival, in AFib. Of note, pt has hx of implanted loop recorder but no known hx AFib. Heart rate improved following fluid resuscitation. Evaluated by EP, started on metoprolol 25 bid & Eliquis 5 bid (CHADSVASC 2). Converted back to NSR. TTE showed normal valves, mild global LV systolic dysfunction (EF 49%). Pt instructed to f/u with his cardiologist when he returns to [place].    #DM  A1c 6.5. Euglycemic in the hospital. Pt instructed to hold metformin for now, can discuss with his PCP when he returns to [place].    On day of discharge, patient is clinically stable with no new exam findings or acute symptoms compared to prior. The patient was seen by the attending physician on the date of discharge and deemed stable and acceptable for discharge. The patient's chronic medical conditions were treated accordingly per the patient's home medication regimen. The patient's medication reconciliation, follow up appointments, discharge instructions, and significant lab and diagnostic studies are as noted.     Discharge follow up action items:     1. Follow up in [place] with PCP (Dr. Acuña), cardiologist (Dr. Wright), pulmonologist  2. Medication changes:  	Please START taking:  		- Metoprolol tartrate 25 mg bid  		- Eliquis 5 mg bid  		- Azithromycin 500 mg daily x5 days  		- tessalon perles TID x5 days  	Please STOP taking:  		- Metformin  Please continue to take all other medications as prescribed.    Patient's ordered code status: DNR/DNI    Patient disposition: Home

## 2023-02-02 NOTE — PROGRESS NOTE ADULT - ATTENDING COMMENTS
72 yr old man visiting from [place] PMH COPD on daily oral steroids, HTN, HLD, RA p/w malaise, worsening cough and SOB x1 day. Found to have sepsis 2/2 hMPV c/b Afib with RVR.     normal respiratory effort, mild wheezing b/l    Supportive care for hMPV with Nebs q6, tessalon perles, guaifenesin, incentive spirometry  C/w home prednisone 20mg qd  Bcx NGTD, no need for Abx  Patient self converted to NSR overnight, c/w Eliquis 5mg BID for CHADSVASC of 2 ([employer] coverage checked), metoprolol 25mg BID per EP recs  TTE with mild global LVSF, EF: 49%, TSH WNL  PT eval: no needs    Stable for DC home. DC time: 32 min

## 2023-02-02 NOTE — PHYSICAL THERAPY INITIAL EVALUATION ADULT - PATIENT PROFILE REVIEW, REHAB EVAL
No Active Order in the Computer; Spoke with RN Gin Simms prior to PT evaluation--> Pt OK for PT consult/OOB activity./yes

## 2023-02-02 NOTE — DISCHARGE NOTE PROVIDER - NSDCCAREPROVSEEN_GEN_ALL_CORE_FT
[facility] Medicine Team 2 [facility] Medicine Team 2 [facility] Medicine Team 2

## 2023-02-02 NOTE — PROGRESS NOTE ADULT - PROBLEM SELECTOR PLAN 5
Unclear hx of DM. Pt is supposed to be taking metformin but has not been adherent  - A1c 6.5 Unclear hx of DM. Pt is supposed to be taking metformin but has not been adherent  - A1c 6.5  - plan for no metformin on discharge, instruct pt to f/u with his PCP in [place] Unclear hx of DM. Pt is supposed to be taking metformin but has not been adherent  - A1c 6.5  - plan for no metformin on discharge, instruct pt to f/u with his PCP in [place] Unclear hx of DM. Pt is supposed to be taking metformin but has not been adherent  - A1c 6.5  - plan for no metformin on discharge, instruct pt to f/u with his PCP in [place]

## 2023-02-02 NOTE — DISCHARGE NOTE NURSING/CASE MANAGEMENT/SOCIAL WORK - NSDCPEFALRISK_GEN_ALL_CORE
For information on Fall & Injury Prevention, visit: [email or web address].[email or web address]/[email or web address]-[email or web address]-[email or web address]-maintains-health-and-mobility OR  https://www.[email or web address]/news/fall-prevention-tips-to-avoid-injury OR  https://www.cdc.gov/steadi/patient.html For information on Fall & Injury Prevention, visit: https://www.[facility]/news/fall-prevention-protects-and-maintains-health-and-mobility OR  https://www.[facility]/news/fall-prevention-tips-to-avoid-injury OR  https://www.cdc.gov/steadi/patient.html For information on Fall & Injury Prevention, visit: https://www.[facility]/news/fall-prevention-protects-and-maintains-health-and-mobility OR  https://www.[facility]/news/fall-prevention-tips-to-avoid-injury OR  https://www.cdc.gov/steadi/patient.html

## 2023-02-02 NOTE — PHYSICAL THERAPY INITIAL EVALUATION ADULT - GENERAL OBSERVATIONS, REHAB EVAL
Pt encountered in semisupine position on stretcher in the ED, no distress, AxOx4, with +IV, and +tele. Pt agreeable to participate in PT evaluation.

## 2023-02-02 NOTE — PATIENT PROFILE ADULT - FALL HARM RISK - UNIVERSAL INTERVENTIONS
Bed in lowest position, wheels locked, appropriate side rails in place/Call bell, personal items and telephone in reach/Instruct patient to call for assistance before getting out of bed or chair/Non-slip footwear when patient is out of bed/[place] to call system/Physically safe environment - no spills, clutter or unnecessary equipment/Purposeful Proactive Rounding/Room/bathroom lighting operational, light cord in reach Bed in lowest position, wheels locked, appropriate side rails in place/Call bell, personal items and telephone in reach/Instruct patient to call for assistance before getting out of bed or chair/Non-slip footwear when patient is out of bed/[place] to call system/Physically safe environment - no spills, clutter or unnecessary equipment/Purposeful Proactive Rounding/Room/bathroom lighting operational, light cord in reach Bed in lowest position, wheels locked, appropriate side rails in place/Call bell, personal items and telephone in reach/Instruct patient to call for assistance before getting out of bed or chair/Non-slip footwear when patient is out of bed/[place] to call system/Physically safe environment - no spills, clutter or unnecessary equipment/Purposeful Proactive Rounding/Room/bathroom lighting operational, light cord in reach

## 2023-02-02 NOTE — PROGRESS NOTE ADULT - SUBJECTIVE AND OBJECTIVE BOX
DATE OF SERVICE: 02-02-23 @ 07:59    Patient is a 72y old  Male who presents with a chief complaint of Shortness of breath (01 Feb 2023 17:05)      SUBJECTIVE / OVERNIGHT EVENTS:  No acute events overnight  Afebrile, hemodynamically stable. *** Tele. SpO2  on RA overnight.  ***    MEDICATIONS  (STANDING):  apixaban 5 milliGRAM(s) Oral every 12 hours  azithromycin  IVPB 500 milliGRAM(s) IV Intermittent every 24 hours  benzonatate 100 milliGRAM(s) Oral three times a day  folic acid 1 milliGRAM(s) Oral daily  metoprolol tartrate 25 milliGRAM(s) Oral two times a day  montelukast 10 milliGRAM(s) Oral daily  pantoprazole    Tablet 40 milliGRAM(s) Oral before breakfast  predniSONE   Tablet 20 milliGRAM(s) Oral daily  simvastatin 20 milliGRAM(s) Oral at bedtime    MEDICATIONS  (PRN):  acetaminophen     Tablet .. 650 milliGRAM(s) Oral every 6 hours PRN Temp greater or equal to 38C (100.4F), Mild Pain (1 - 3)  guaiFENesin Oral Liquid (Sugar-Free) 100 milliGRAM(s) Oral every 6 hours PRN Cough  ipratropium    for Nebulization 500 MICROGram(s) Nebulizer every 6 hours PRN Shortness of Breath and/or Wheezing      Vital Signs Last 24 Hrs  T(C): 37 (02 Feb 2023 06:20), Max: 39.1 (01 Feb 2023 10:00)  T(F): 98.6 (02 Feb 2023 06:20), Max: 102.3 (01 Feb 2023 10:00)  HR: 99 (02 Feb 2023 06:20) (98 - 149)  BP: 130/72 (02 Feb 2023 06:20) (111/68 - 153/94)  BP(mean): --  RR: 16 (02 Feb 2023 06:20) (16 - 30)  SpO2: 96% (02 Feb 2023 06:20) (96% - 100%)    Parameters below as of 02 Feb 2023 06:20  Patient On (Oxygen Delivery Method): room air      CAPILLARY BLOOD GLUCOSE  POCT Blood Glucose.: 100 mg/dL (01 Feb 2023 09:25)      PHYSICAL EXAM:  ***  GENERAL: No apparent distress  HEAD:  Atraumatic, normocephalic  EYES: EOMI, PERRLA, conjunctiva and sclera clear  NECK: Supple, no JVD  CHEST/LUNG: Clear to auscultation bilaterally; No wheeze, rales, rhonchi  HEART: Regular rate and rhythm; No murmurs, rubs, or gallops  ABDOMEN: Soft, nontender, nondistended; Bowel sounds present  EXTREMITIES:  2+ peripheral pulses; No clubbing, cyanosis, or edema  PSYCH: AAOx3  NEUROLOGY: No focal deficits  SKIN: No rashes or lesions      LABS:                        14.6   10.16 )-----------( 179      ( 02 Feb 2023 06:00 )             46.5     02-02    134<L>  |  103  |  22  ----------------------------<  112<H>  4.3   |  22  |  0.99    Ca    8.9      02 Feb 2023 06:00  Phos  3.8     02-02  Mg     2.20     02-02    TPro  7.2  /  Alb  4.4  /  TBili  0.5  /  DBili  x   /  AST  22  /  ALT  22  /  AlkPhos  61  02-01    PT/INR - ( 01 Feb 2023 09:50 )   PT: 12.5 sec;   INR: 1.08 ratio      PTT - ( 01 Feb 2023 09:50 )  PTT:24.9 sec        RADIOLOGY & ADDITIONAL TESTS:  Reviewed       DATE OF SERVICE: 02-02-23 @ 07:59    Patient is a 72y old  Male who presents with a chief complaint of Shortness of breath (01 Feb 2023 17:05)      SUBJECTIVE / OVERNIGHT EVENTS:  No acute events overnight  Afebrile, hemodynamically stable. Converted from AFib to NSR overnight. SpO2  on RA  Feels his breathing is improved this am. Reports persistent cough, however he did not know to ask for breathing treatments or cough prns    MEDICATIONS  (STANDING):  apixaban 5 milliGRAM(s) Oral every 12 hours  azithromycin  IVPB 500 milliGRAM(s) IV Intermittent every 24 hours  benzonatate 100 milliGRAM(s) Oral three times a day  folic acid 1 milliGRAM(s) Oral daily  metoprolol tartrate 25 milliGRAM(s) Oral two times a day  montelukast 10 milliGRAM(s) Oral daily  pantoprazole    Tablet 40 milliGRAM(s) Oral before breakfast  predniSONE   Tablet 20 milliGRAM(s) Oral daily  simvastatin 20 milliGRAM(s) Oral at bedtime    MEDICATIONS  (PRN):  acetaminophen     Tablet .. 650 milliGRAM(s) Oral every 6 hours PRN Temp greater or equal to 38C (100.4F), Mild Pain (1 - 3)  guaiFENesin Oral Liquid (Sugar-Free) 100 milliGRAM(s) Oral every 6 hours PRN Cough  ipratropium    for Nebulization 500 MICROGram(s) Nebulizer every 6 hours PRN Shortness of Breath and/or Wheezing      Vital Signs Last 24 Hrs  T(C): 37 (02 Feb 2023 06:20), Max: 39.1 (01 Feb 2023 10:00)  T(F): 98.6 (02 Feb 2023 06:20), Max: 102.3 (01 Feb 2023 10:00)  HR: 99 (02 Feb 2023 06:20) (98 - 149)  BP: 130/72 (02 Feb 2023 06:20) (111/68 - 153/94)  BP(mean): --  RR: 16 (02 Feb 2023 06:20) (16 - 30)  SpO2: 96% (02 Feb 2023 06:20) (96% - 100%)    Parameters below as of 02 Feb 2023 06:20  Patient On (Oxygen Delivery Method): room air      CAPILLARY BLOOD GLUCOSE  POCT Blood Glucose.: 100 mg/dL (01 Feb 2023 09:25)      PHYSICAL EXAM:  GENERAL: No apparent distress  EYES: Sclera clear  NECK: Supple, no JVD  CHEST/LUNG: Clear to auscultation bilaterally; No wheeze, rales, rhonchi. Non labored appearance on RA  HEART: Regular rate and rhythm; No murmurs, rubs, or gallops  ABDOMEN: Soft, nontender, nondistended; Bowel sounds present  EXTREMITIES:  2+ peripheral pulses. Trace pitting edema BLEs  PSYCH: AAOx3  NEUROLOGY: No focal deficits  SKIN: No rashes or lesions      LABS:                        14.6   10.16 )-----------( 179      ( 02 Feb 2023 06:00 )             46.5     02-02    134<L>  |  103  |  22  ----------------------------<  112<H>  4.3   |  22  |  0.99    Ca    8.9      02 Feb 2023 06:00  Phos  3.8     02-02  Mg     2.20     02-02    TPro  7.2  /  Alb  4.4  /  TBili  0.5  /  DBili  x   /  AST  22  /  ALT  22  /  AlkPhos  61  02-01    PT/INR - ( 01 Feb 2023 09:50 )   PT: 12.5 sec;   INR: 1.08 ratio      PTT - ( 01 Feb 2023 09:50 )  PTT:24.9 sec        RADIOLOGY & ADDITIONAL TESTS:  Reviewed    < from: Transthoracic Echocardiogram (02.01.23 @ 16:20) >  DIMENSIONS:  Dimensions:     Normal Values:  LA:     3.5 cm    2.0 - 4.0 cm  Ao:     3.3 cm    2.0 - 3.8 cm  SEPTUM: 0.8 cm    0.6 - 1.2 cm  PWT:    0.8 cm    0.6 - 1.1 cm  LVIDd:  4.5 cm    3.0 - 5.6 cm  LVIDs:  3.4 cm    1.8 - 4.0 cm  Derived Variables:  LVMI: 54 g/m2  RWT: 0.35  Fractional short: 24 %  Ejection Fraction (Modified Kapaln Rule): 49 %  ------------------------------------------------------------------------  OBSERVATIONS:  Mitral Valve: Mitral annular calcification, otherwise  normal mitral valve. Mild mitral regurgitation.  Aortic Root: Normal aortic root.  Aortic Valve: Aortic valve leaflet morphology not well  visualized.  Left Atrium: Normal left atrium.  LA volume index = 24  cc/m2.  Left Ventricle: Mild global left ventricular systolic  dysfunction. Normal left ventricular internal dimensions  and wall thicknesses.  Right Heart: Normal right atrium. Normal right ventricular  size and function. Normal tricuspid valve. Minimal  tricuspid regurgitation. Normal pulmonic valve.  Pericardium/PleuraNormal pericardium with no pericardial  effusion.  ------------------------------------------------------------------------  CONCLUSIONS:  1. Mitral annular calcification, otherwise normal mitral  valve. Mild mitral regurgitation.  2. Normal left ventricular internal dimensions and wall  thicknesses.  3. Mild global left ventricular systolic dysfunction.  4. Normal right ventricular size and function.    < end of copied text >         DATE OF SERVICE: 02-02-23 @ 07:59    Patient is a 72y old  Male who presents with a chief complaint of Shortness of breath (01 Feb 2023 17:05)      SUBJECTIVE / OVERNIGHT EVENTS:  No acute events overnight  Afebrile, hemodynamically stable. Converted from AFib to NSR overnight. SpO2  on RA  Feels his breathing is improved this am. Reports persistent cough, however he did not know to ask for breathing treatments or cough prns    MEDICATIONS  (STANDING):  apixaban 5 milliGRAM(s) Oral every 12 hours  azithromycin  IVPB 500 milliGRAM(s) IV Intermittent every 24 hours  benzonatate 100 milliGRAM(s) Oral three times a day  folic acid 1 milliGRAM(s) Oral daily  metoprolol tartrate 25 milliGRAM(s) Oral two times a day  montelukast 10 milliGRAM(s) Oral daily  pantoprazole    Tablet 40 milliGRAM(s) Oral before breakfast  predniSONE   Tablet 20 milliGRAM(s) Oral daily  simvastatin 20 milliGRAM(s) Oral at bedtime    MEDICATIONS  (PRN):  acetaminophen     Tablet .. 650 milliGRAM(s) Oral every 6 hours PRN Temp greater or equal to 38C (100.4F), Mild Pain (1 - 3)  guaiFENesin Oral Liquid (Sugar-Free) 100 milliGRAM(s) Oral every 6 hours PRN Cough  ipratropium    for Nebulization 500 MICROGram(s) Nebulizer every 6 hours PRN Shortness of Breath and/or Wheezing      Vital Signs Last 24 Hrs  T(C): 37 (02 Feb 2023 06:20), Max: 39.1 (01 Feb 2023 10:00)  T(F): 98.6 (02 Feb 2023 06:20), Max: 102.3 (01 Feb 2023 10:00)  HR: 99 (02 Feb 2023 06:20) (98 - 149)  BP: 130/72 (02 Feb 2023 06:20) (111/68 - 153/94)  BP(mean): --  RR: 16 (02 Feb 2023 06:20) (16 - 30)  SpO2: 96% (02 Feb 2023 06:20) (96% - 100%)    Parameters below as of 02 Feb 2023 06:20  Patient On (Oxygen Delivery Method): room air      CAPILLARY BLOOD GLUCOSE  POCT Blood Glucose.: 100 mg/dL (01 Feb 2023 09:25)      PHYSICAL EXAM:  GENERAL: No apparent distress  EYES: Sclera clear  NECK: Supple, no JVD  CHEST/LUNG: Clear to auscultation bilaterally; No wheeze, rales, rhonchi. Non labored appearance on RA  HEART: Regular rate and rhythm; No murmurs, rubs, or gallops  ABDOMEN: Soft, nontender, nondistended; Bowel sounds present  EXTREMITIES:  2+ peripheral pulses. Trace pitting edema BLEs  PSYCH: AAOx3  NEUROLOGY: No focal deficits  SKIN: No rashes or lesions      LABS:                        14.6   10.16 )-----------( 179      ( 02 Feb 2023 06:00 )             46.5     02-02    134<L>  |  103  |  22  ----------------------------<  112<H>  4.3   |  22  |  0.99    Ca    8.9      02 Feb 2023 06:00  Phos  3.8     02-02  Mg     2.20     02-02    TPro  7.2  /  Alb  4.4  /  TBili  0.5  /  DBili  x   /  AST  22  /  ALT  22  /  AlkPhos  61  02-01    PT/INR - ( 01 Feb 2023 09:50 )   PT: 12.5 sec;   INR: 1.08 ratio      PTT - ( 01 Feb 2023 09:50 )  PTT:24.9 sec        RADIOLOGY & ADDITIONAL TESTS:  Reviewed    < from: Transthoracic Echocardiogram (02.01.23 @ 16:20) >  DIMENSIONS:  Dimensions:     Normal Values:  LA:     3.5 cm    2.0 - 4.0 cm  Ao:     3.3 cm    2.0 - 3.8 cm  SEPTUM: 0.8 cm    0.6 - 1.2 cm  PWT:    0.8 cm    0.6 - 1.1 cm  LVIDd:  4.5 cm    3.0 - 5.6 cm  LVIDs:  3.4 cm    1.8 - 4.0 cm  Derived Variables:  LVMI: 54 g/m2  RWT: 0.35  Fractional short: 24 %  Ejection Fraction (Modified Kaplan Rule): 49 %  ------------------------------------------------------------------------  OBSERVATIONS:  Mitral Valve: Mitral annular calcification, otherwise  normal mitral valve. Mild mitral regurgitation.  Aortic Root: Normal aortic root.  Aortic Valve: Aortic valve leaflet morphology not well  visualized.  Left Atrium: Normal left atrium.  LA volume index = 24  cc/m2.  Left Ventricle: Mild global left ventricular systolic  dysfunction. Normal left ventricular internal dimensions  and wall thicknesses.  Right Heart: Normal right atrium. Normal right ventricular  size and function. Normal tricuspid valve. Minimal  tricuspid regurgitation. Normal pulmonic valve.  Pericardium/PleuraNormal pericardium with no pericardial  effusion.  ------------------------------------------------------------------------  CONCLUSIONS:  1. Mitral annular calcification, otherwise normal mitral  valve. Mild mitral regurgitation.  2. Normal left ventricular internal dimensions and wall  thicknesses.  3. Mild global left ventricular systolic dysfunction.  4. Normal right ventricular size and function.    < end of copied text >

## 2023-02-02 NOTE — PROGRESS NOTE ADULT - ASSESSMENT
72 yr old male, PMH of HTN, HLD, COPD, former smoker presents to the Emergency Room with shortness of breath and dry cough. Pt states that he has a loop recorder ([employer]) that was placed over a year ago by his cardiologist in [place] concerned for "low heart rate", but denies any symptoms. In ED, patient found to be in Afib with RVR up to 130s. Currently in SR.     ## New found Afib with RVR -- self converted to SR overnight    Plan:     -Continue on telemetry monitoring   -Continue metoprolol 25mg BID  -Continue AC Eliquis 5mg BID for thromboembolic ppx, [id number] ( pt. denies hx of bleeding dz)   -Maintain K >4.0 and Mg >2 (replenish as needed)  -Obtain ECHO to assess LV/valvular function  -TSH wnl  - + Metapneumovirus on contact isolation , Continue care with primary team , 72 yr old male, PMH of HTN, HLD, COPD, former smoker presents to the Emergency Room with shortness of breath and dry cough. Pt states that he has a loop recorder ([employer]) that was placed over a year ago by his cardiologist in [place] concerned for "low heart rate", but denies any symptoms. In ED, patient found to be in Afib with RVR up to 130s. Currently in SR.     ## New found Afib with RVR -- self converted to SR overnight    Plan:     -Continue on telemetry monitoring   -Continue metoprolol 25mg BID  -Continue AC Eliquis 5mg BID for thromboembolic ppx, [id number] ( pt. denies hx of bleeding dz)   -Maintain K >4.0 and Mg >2 (replenish as needed)  -Obtain ECHO to assess LV/valvular function  -TSH wnl  - + Metapneumovirus on contact isolation , Continue care with primary team , 72 yr old male, PMH of HTN, HLD, COPD, former smoker presents to the Emergency Room with shortness of breath and dry cough. Pt states that he has a loop recorder ([employer]) that was placed over a year ago by his cardiologist in [place] concerned for "low heart rate", but denies any symptoms. In ED, patient found to be in Afib with RVR up to 130s. Currently in SR.     ## New found Afib with RVR -- self converted to SR overnight    Plan:     -Continue on telemetry monitoring   -Continue metoprolol 25mg BID  -Continue AC Eliquis 5mg BID for thromboembolic ppx, [id number]- 2 ( pt. denies hx of bleeding dz)   -Maintain K >4.0 and Mg >2 (replenish as needed)  -Obtain ECHO to assess LV/valvular function  -TSH wnl  - + Metapneumovirus on contact isolation , Continue care with primary team ,

## 2023-02-02 NOTE — PHYSICAL THERAPY INITIAL EVALUATION ADULT - ADDITIONAL COMMENTS
Pt reports that he lives in a private house with his wife in [place] with ~3-4 steps to enter; bedroom is on the first floor. [place]. Prior to hospital admission pt was completely independent and used no assistive device with ambulation. He does own a rolling walker, single axis cane, and wheel chair if he needs. Pt denies any recent falls.    Pt left comfortable on stretcher, NAD, all lines intact, all precautions maintained, with call bell in reach, and RN aware of PT evaluation. Pt reports that he lives in a private house with his wife in [place] with ~3-4 steps to enter; bedroom is on the first floor. [place]. Prior to hospital admission pt was completely independent and used no assistive device with ambulation. He does own a rolling walker, single axis cane, and wheel chair if he needs. Pt denies any recent falls.    Pt left comfortable on stretcher, NAD, all lines intact, all precautions maintained, with call bell in reach, and RN aware of PT evaluation. Pt reports that he lives in a private house with his wife in [place] with ~3-4 steps to enter; bedroom is on the first floor. Pt [place]. Prior to hospital admission pt was completely independent and used no assistive device with ambulation. He does own a rolling walker, single axis cane, and wheel chair if he needs. Pt denies any recent falls.    Pt left comfortable on stretcher, NAD, all lines intact, all precautions maintained, with call bell in reach, and RN aware of PT evaluation.

## 2023-02-02 NOTE — DISCHARGE NOTE PROVIDER - PROVIDER TOKENS
FREE:[LAST:[Katrin Jo],FIRST:[Marie],PHONE:[(   )    -],FAX:[(   )    -],ADDRESS:[[place], [place]],FOLLOWUP:[1 week],ESTABLISHEDPATIENT:[T]],FREE:[LAST:[Kyle],FIRST:[Franki],PHONE:[(   )    -],FAX:[(   )    -],ADDRESS:[[place], [place]],FOLLOWUP:[2 weeks],ESTABLISHEDPATIENT:[T]] FREE:[LAST:[Katrin Jo],FIRST:[Marie],PHONE:[(   )    -],FAX:[(   )    -],ADDRESS:[[place], [place]],FOLLOWUP:[1 week],ESTABLISHEDPATIENT:[T]],FREE:[LAST:[Kyle],FIRST:[Franki],PHONE:[(   )    -],FAX:[(   )    -],ADDRESS:[[place], [place]],FOLLOWUP:[2 weeks],ESTABLISHEDPATIENT:[T]] FREE:[LAST:[Katrin Jo],FIRST:[Marie],PHONE:[(   )    -],FAX:[(   )    -],ADDRESS:[[place], [place]],FOLLOWUP:[1 week],ESTABLISHEDPATIENT:[T]],FREE:[LAST:[Kyle],FIRST:[Franki],PHONE:[(   )    -],FAX:[(   )    -],ADDRESS:[[place], [place]],FOLLOWUP:[2 weeks],ESTABLISHEDPATIENT:[T]]

## 2023-02-03 LAB
CULTURE RESULTS: SIGNIFICANT CHANGE UP
SPECIMEN SOURCE: SIGNIFICANT CHANGE UP

## 2023-02-06 LAB
CULTURE RESULTS: SIGNIFICANT CHANGE UP
SPECIMEN SOURCE: SIGNIFICANT CHANGE UP

## 2023-04-04 NOTE — ED ADULT TRIAGE NOTE - AS PAIN REST
Presents today, along with spouse, for evaluation of epigastric pain.  This pain has been present intermittent x 6 months.  Describes pain as a burning sensation that appears to be worse during the night.  Duration:  hours.  Associated symptoms:  15 lb over the last 2 months, nausea, occasional vomiting, changes in bowel habits, excessive flatulence and bloating.   Alleviating symptoms:  treatment for h. pylori by PCP improved symptoms slightly, Pepto-Bismol, defecation and belching.   Aggravating:  eating in general.   Also voices constipation alternating diarrhea that has been occurring over the month.  Voices hx of constipation with defecation occurring once every 2-3 days.  May not achieve a complete sense of evacuation.  First stool is usually formed followed by non-bloody loose stools.  Diarrhea episodes may last for a few days before gradually subsiding.  Denies family hx of colon cancer, IBD.  Started taking oral iron tablets a month ago and has noticed a darker color to stool.  Voices a normal KUB by her PCP a month ago.    Denies prior colonoscopy or EGD.
0 (no pain/absence of nonverbal indicators of pain)

## 2023-12-10 RX ORDER — METHOTREXATE 2.5 MG/1
5 TABLET ORAL
Qty: 0 | Refills: 0 | DISCHARGE

## 2023-12-10 RX ORDER — OMEPRAZOLE 10 MG/1
1 CAPSULE, DELAYED RELEASE ORAL
Qty: 0 | Refills: 0 | DISCHARGE

## 2023-12-10 RX ORDER — MONTELUKAST 4 MG/1
1 TABLET, CHEWABLE ORAL
Qty: 0 | Refills: 0 | DISCHARGE

## 2023-12-10 RX ORDER — SIMVASTATIN 20 MG/1
1 TABLET, FILM COATED ORAL
Qty: 0 | Refills: 0 | DISCHARGE

## 2023-12-10 RX ORDER — NABUMETONE 750 MG
1 TABLET ORAL
Qty: 0 | Refills: 0 | DISCHARGE

## 2023-12-10 RX ORDER — METHOTREXATE 2.5 MG/1
1 TABLET ORAL
Qty: 0 | Refills: 0 | DISCHARGE

## 2023-12-10 RX ORDER — BUDESONIDE, GLYCOPYRROLATE, AND FORMOTEROL FUMARATE 160; 9; 4.8 UG/1; UG/1; UG/1
2 AEROSOL, METERED RESPIRATORY (INHALATION)
Qty: 0 | Refills: 0 | DISCHARGE

## 2023-12-10 RX ORDER — ALBUTEROL 90 UG/1
2 AEROSOL, METERED ORAL
Qty: 0 | Refills: 0 | DISCHARGE

## 2023-12-10 RX ORDER — TRAMADOL HYDROCHLORIDE 50 MG/1
1 TABLET ORAL
Qty: 0 | Refills: 0 | DISCHARGE

## 2023-12-10 RX ORDER — FOLIC ACID 0.8 MG
1 TABLET ORAL
Qty: 0 | Refills: 0 | DISCHARGE

## 2025-07-10 NOTE — PROGRESS NOTE ADULT - PROBLEM/PLAN-6
avoid brushing teeth in area of surgery.  soft food for 2 days. come to dr dutta's office for previously scheduled postoperative visit in 1 - 2 weeks Stable DISPLAY PLAN FREE TEXT